# Patient Record
Sex: MALE | Race: BLACK OR AFRICAN AMERICAN | NOT HISPANIC OR LATINO | URBAN - METROPOLITAN AREA
[De-identification: names, ages, dates, MRNs, and addresses within clinical notes are randomized per-mention and may not be internally consistent; named-entity substitution may affect disease eponyms.]

---

## 2018-03-23 ENCOUNTER — EMERGENCY (EMERGENCY)
Facility: HOSPITAL | Age: 58
LOS: 1 days | Discharge: HOME | End: 2018-03-23
Attending: EMERGENCY MEDICINE
Payer: COMMERCIAL

## 2018-03-23 VITALS
RESPIRATION RATE: 20 BRPM | DIASTOLIC BLOOD PRESSURE: 89 MMHG | WEIGHT: 225.09 LBS | TEMPERATURE: 98 F | SYSTOLIC BLOOD PRESSURE: 151 MMHG | HEIGHT: 66 IN | HEART RATE: 68 BPM | OXYGEN SATURATION: 97 %

## 2018-03-23 LAB
ANION GAP SERPL CALC-SCNC: 8 MMOL/L — SIGNIFICANT CHANGE UP (ref 5–17)
BASOPHILS # BLD AUTO: 0.1 K/UL — SIGNIFICANT CHANGE UP (ref 0–0.2)
BASOPHILS NFR BLD AUTO: 2.1 % — HIGH (ref 0–2)
BUN SERPL-MCNC: 13 MG/DL — SIGNIFICANT CHANGE UP (ref 7–18)
CALCIUM SERPL-MCNC: 9.4 MG/DL — SIGNIFICANT CHANGE UP (ref 8.4–10.5)
CHLORIDE SERPL-SCNC: 105 MMOL/L — SIGNIFICANT CHANGE UP (ref 96–108)
CK MB BLD-MCNC: <0.6 % — SIGNIFICANT CHANGE UP (ref 0–3.5)
CK MB CFR SERPL CALC: <1 NG/ML — SIGNIFICANT CHANGE UP (ref 0–3.6)
CK SERPL-CCNC: 161 U/L — SIGNIFICANT CHANGE UP (ref 35–232)
CO2 SERPL-SCNC: 26 MMOL/L — SIGNIFICANT CHANGE UP (ref 22–31)
CREAT SERPL-MCNC: 1.17 MG/DL — SIGNIFICANT CHANGE UP (ref 0.5–1.3)
EOSINOPHIL # BLD AUTO: 0.1 K/UL — SIGNIFICANT CHANGE UP (ref 0–0.5)
EOSINOPHIL NFR BLD AUTO: 2.3 % — SIGNIFICANT CHANGE UP (ref 0–6)
GLUCOSE SERPL-MCNC: 98 MG/DL — SIGNIFICANT CHANGE UP (ref 70–99)
HCT VFR BLD CALC: 50.7 % — HIGH (ref 39–50)
HGB BLD-MCNC: 15.4 G/DL — SIGNIFICANT CHANGE UP (ref 13–17)
LYMPHOCYTES # BLD AUTO: 2.1 K/UL — SIGNIFICANT CHANGE UP (ref 1–3.3)
LYMPHOCYTES # BLD AUTO: 46.5 % — HIGH (ref 13–44)
MCHC RBC-ENTMCNC: 26.6 PG — LOW (ref 27–34)
MCHC RBC-ENTMCNC: 30.5 GM/DL — LOW (ref 32–36)
MCV RBC AUTO: 87.5 FL — SIGNIFICANT CHANGE UP (ref 80–100)
MONOCYTES # BLD AUTO: 0.5 K/UL — SIGNIFICANT CHANGE UP (ref 0–0.9)
MONOCYTES NFR BLD AUTO: 12 % — SIGNIFICANT CHANGE UP (ref 2–14)
NEUTROPHILS # BLD AUTO: 1.7 K/UL — LOW (ref 1.8–7.4)
NEUTROPHILS NFR BLD AUTO: 37.1 % — LOW (ref 43–77)
PLATELET # BLD AUTO: 162 K/UL — SIGNIFICANT CHANGE UP (ref 150–400)
POTASSIUM SERPL-MCNC: 4.1 MMOL/L — SIGNIFICANT CHANGE UP (ref 3.5–5.3)
POTASSIUM SERPL-SCNC: 4.1 MMOL/L — SIGNIFICANT CHANGE UP (ref 3.5–5.3)
RBC # BLD: 5.79 M/UL — SIGNIFICANT CHANGE UP (ref 4.2–5.8)
RBC # FLD: 12.9 % — SIGNIFICANT CHANGE UP (ref 10.3–14.5)
SODIUM SERPL-SCNC: 139 MMOL/L — SIGNIFICANT CHANGE UP (ref 135–145)
TROPONIN I SERPL-MCNC: <0.015 NG/ML — SIGNIFICANT CHANGE UP (ref 0–0.04)
WBC # BLD: 4.6 K/UL — SIGNIFICANT CHANGE UP (ref 3.8–10.5)
WBC # FLD AUTO: 4.6 K/UL — SIGNIFICANT CHANGE UP (ref 3.8–10.5)

## 2018-03-23 PROCEDURE — 82550 ASSAY OF CK (CPK): CPT

## 2018-03-23 PROCEDURE — 71046 X-RAY EXAM CHEST 2 VIEWS: CPT | Mod: 26

## 2018-03-23 PROCEDURE — 85027 COMPLETE CBC AUTOMATED: CPT

## 2018-03-23 PROCEDURE — 82553 CREATINE MB FRACTION: CPT

## 2018-03-23 PROCEDURE — 93005 ELECTROCARDIOGRAM TRACING: CPT

## 2018-03-23 PROCEDURE — 80048 BASIC METABOLIC PNL TOTAL CA: CPT

## 2018-03-23 PROCEDURE — 99284 EMERGENCY DEPT VISIT MOD MDM: CPT

## 2018-03-23 PROCEDURE — 99283 EMERGENCY DEPT VISIT LOW MDM: CPT | Mod: 25

## 2018-03-23 PROCEDURE — 71046 X-RAY EXAM CHEST 2 VIEWS: CPT

## 2018-03-23 PROCEDURE — 84484 ASSAY OF TROPONIN QUANT: CPT

## 2018-03-23 RX ORDER — METFORMIN HYDROCHLORIDE 850 MG/1
0 TABLET ORAL
Qty: 0 | Refills: 0 | COMMUNITY

## 2018-03-23 NOTE — ED PROVIDER NOTE - OBJECTIVE STATEMENT
57 y/o M pt with PMHx of DM presents to ED c/o chest pain with associated mild shortness of breath and ALATORRE x 1 week, worsening over the last two days. Pt reports chest pain is localized both on the R side and L side, going back and forth between the R and L. In ED, pt has no active chest pain. Pt denies fever, chills, nausea, vomiting, diaphoresis, or any other complaints. NKDA.

## 2018-03-23 NOTE — ED PROVIDER NOTE - PROGRESS NOTE DETAILS
Pt reassessed, chest pain improved.  Admission discussed and offered however patient prefers to follow up with PMD.  Pt given copy of all results. Will d/c AMA

## 2021-10-22 NOTE — ED PROVIDER NOTE - NS ED MD EM SELECTION
[id number] Comprehensive Finasteride Male Counseling: Finasteride Counseling:  I discussed with the patient the risks of use of finasteride including but not limited to decreased libido, decreased ejaculate volume, gynecomastia, and depression. Women should not handle medication.  All of the patient's questions and concerns were addressed. Finasteride Counseling:  I discussed with the patient the risks of use of finasteride including but not limited to decreased libido, decreased ejaculate volume, gynecomastia, and depression. Women should not handle medication.  All of the patient's questions and concerns were addressed.

## 2023-02-09 ENCOUNTER — EMERGENCY (EMERGENCY)
Facility: HOSPITAL | Age: 63
LOS: 1 days | Discharge: HOME | End: 2023-02-09
Attending: EMERGENCY MEDICINE | Admitting: EMERGENCY MEDICINE
Payer: COMMERCIAL

## 2023-02-09 VITALS
TEMPERATURE: 98 F | SYSTOLIC BLOOD PRESSURE: 119 MMHG | RESPIRATION RATE: 18 BRPM | OXYGEN SATURATION: 98 % | DIASTOLIC BLOOD PRESSURE: 71 MMHG | HEART RATE: 73 BPM

## 2023-02-09 VITALS
TEMPERATURE: 98 F | DIASTOLIC BLOOD PRESSURE: 70 MMHG | SYSTOLIC BLOOD PRESSURE: 130 MMHG | RESPIRATION RATE: 18 BRPM | OXYGEN SATURATION: 97 % | HEART RATE: 82 BPM

## 2023-02-09 PROBLEM — E11.9 TYPE 2 DIABETES MELLITUS WITHOUT COMPLICATIONS: Chronic | Status: ACTIVE | Noted: 2018-03-23

## 2023-02-09 LAB
ALBUMIN SERPL ELPH-MCNC: 4 G/DL — SIGNIFICANT CHANGE UP (ref 3.3–5)
ALP SERPL-CCNC: 71 U/L — SIGNIFICANT CHANGE UP (ref 40–120)
ALT FLD-CCNC: 8 U/L — SIGNIFICANT CHANGE UP (ref 4–41)
ANION GAP SERPL CALC-SCNC: 10 MMOL/L — SIGNIFICANT CHANGE UP (ref 7–14)
APPEARANCE UR: CLEAR — SIGNIFICANT CHANGE UP
AST SERPL-CCNC: 11 U/L — SIGNIFICANT CHANGE UP (ref 4–40)
BACTERIA # UR AUTO: NEGATIVE — SIGNIFICANT CHANGE UP
BASOPHILS # BLD AUTO: 0.02 K/UL — SIGNIFICANT CHANGE UP (ref 0–0.2)
BASOPHILS NFR BLD AUTO: 0.3 % — SIGNIFICANT CHANGE UP (ref 0–2)
BILIRUB SERPL-MCNC: 0.7 MG/DL — SIGNIFICANT CHANGE UP (ref 0.2–1.2)
BILIRUB UR-MCNC: NEGATIVE — SIGNIFICANT CHANGE UP
BUN SERPL-MCNC: 10 MG/DL — SIGNIFICANT CHANGE UP (ref 7–23)
CALCIUM SERPL-MCNC: 9.4 MG/DL — SIGNIFICANT CHANGE UP (ref 8.4–10.5)
CHLORIDE SERPL-SCNC: 100 MMOL/L — SIGNIFICANT CHANGE UP (ref 98–107)
CO2 SERPL-SCNC: 26 MMOL/L — SIGNIFICANT CHANGE UP (ref 22–31)
COLOR SPEC: YELLOW — SIGNIFICANT CHANGE UP
CREAT SERPL-MCNC: 1 MG/DL — SIGNIFICANT CHANGE UP (ref 0.5–1.3)
DIFF PNL FLD: ABNORMAL
EGFR: 85 ML/MIN/1.73M2 — SIGNIFICANT CHANGE UP
EOSINOPHIL # BLD AUTO: 0.04 K/UL — SIGNIFICANT CHANGE UP (ref 0–0.5)
EOSINOPHIL NFR BLD AUTO: 0.5 % — SIGNIFICANT CHANGE UP (ref 0–6)
EPI CELLS # UR: 3 /HPF — SIGNIFICANT CHANGE UP (ref 0–5)
GLUCOSE SERPL-MCNC: 223 MG/DL — HIGH (ref 70–99)
GLUCOSE UR QL: ABNORMAL
HCT VFR BLD CALC: 48.7 % — SIGNIFICANT CHANGE UP (ref 39–50)
HGB BLD-MCNC: 15.5 G/DL — SIGNIFICANT CHANGE UP (ref 13–17)
HYALINE CASTS # UR AUTO: 1 /LPF — SIGNIFICANT CHANGE UP (ref 0–7)
IANC: 5.23 K/UL — SIGNIFICANT CHANGE UP (ref 1.8–7.4)
IMM GRANULOCYTES NFR BLD AUTO: 0.3 % — SIGNIFICANT CHANGE UP (ref 0–0.9)
KETONES UR-MCNC: ABNORMAL
LEUKOCYTE ESTERASE UR-ACNC: NEGATIVE — SIGNIFICANT CHANGE UP
LIDOCAIN IGE QN: 12 U/L — SIGNIFICANT CHANGE UP (ref 7–60)
LYMPHOCYTES # BLD AUTO: 1.42 K/UL — SIGNIFICANT CHANGE UP (ref 1–3.3)
LYMPHOCYTES # BLD AUTO: 19 % — SIGNIFICANT CHANGE UP (ref 13–44)
MCHC RBC-ENTMCNC: 27 PG — SIGNIFICANT CHANGE UP (ref 27–34)
MCHC RBC-ENTMCNC: 31.8 GM/DL — LOW (ref 32–36)
MCV RBC AUTO: 84.7 FL — SIGNIFICANT CHANGE UP (ref 80–100)
MONOCYTES # BLD AUTO: 0.75 K/UL — SIGNIFICANT CHANGE UP (ref 0–0.9)
MONOCYTES NFR BLD AUTO: 10 % — SIGNIFICANT CHANGE UP (ref 2–14)
NEUTROPHILS # BLD AUTO: 5.23 K/UL — SIGNIFICANT CHANGE UP (ref 1.8–7.4)
NEUTROPHILS NFR BLD AUTO: 69.9 % — SIGNIFICANT CHANGE UP (ref 43–77)
NITRITE UR-MCNC: NEGATIVE — SIGNIFICANT CHANGE UP
NRBC # BLD: 0 /100 WBCS — SIGNIFICANT CHANGE UP (ref 0–0)
NRBC # FLD: 0 K/UL — SIGNIFICANT CHANGE UP (ref 0–0)
PH UR: 7.5 — SIGNIFICANT CHANGE UP (ref 5–8)
PLATELET # BLD AUTO: 164 K/UL — SIGNIFICANT CHANGE UP (ref 150–400)
POTASSIUM SERPL-MCNC: 4.4 MMOL/L — SIGNIFICANT CHANGE UP (ref 3.5–5.3)
POTASSIUM SERPL-SCNC: 4.4 MMOL/L — SIGNIFICANT CHANGE UP (ref 3.5–5.3)
PROT SERPL-MCNC: 7.4 G/DL — SIGNIFICANT CHANGE UP (ref 6–8.3)
PROT UR-MCNC: ABNORMAL
RBC # BLD: 5.75 M/UL — SIGNIFICANT CHANGE UP (ref 4.2–5.8)
RBC # FLD: 13.4 % — SIGNIFICANT CHANGE UP (ref 10.3–14.5)
RBC CASTS # UR COMP ASSIST: 24 /HPF — HIGH (ref 0–4)
SARS-COV-2 RNA SPEC QL NAA+PROBE: SIGNIFICANT CHANGE UP
SODIUM SERPL-SCNC: 136 MMOL/L — SIGNIFICANT CHANGE UP (ref 135–145)
SP GR SPEC: 1.02 — SIGNIFICANT CHANGE UP (ref 1.01–1.05)
TROPONIN T, HIGH SENSITIVITY RESULT: 10 NG/L — SIGNIFICANT CHANGE UP
UROBILINOGEN FLD QL: ABNORMAL
WBC # BLD: 7.48 K/UL — SIGNIFICANT CHANGE UP (ref 3.8–10.5)
WBC # FLD AUTO: 7.48 K/UL — SIGNIFICANT CHANGE UP (ref 3.8–10.5)
WBC UR QL: 8 /HPF — HIGH (ref 0–5)

## 2023-02-09 PROCEDURE — 71045 X-RAY EXAM CHEST 1 VIEW: CPT | Mod: 26

## 2023-02-09 PROCEDURE — 93971 EXTREMITY STUDY: CPT | Mod: 26,LT

## 2023-02-09 PROCEDURE — 99284 EMERGENCY DEPT VISIT MOD MDM: CPT

## 2023-02-09 RX ORDER — CEPHALEXIN 500 MG
1 CAPSULE ORAL
Qty: 14 | Refills: 0
Start: 2023-02-09 | End: 2023-02-15

## 2023-02-09 NOTE — ED PROVIDER NOTE - PATIENT PORTAL LINK FT
You can access the FollowMyHealth Patient Portal offered by [facility] by registering at the following website: [email or web address]. By joining [employer]’s FollowMyHealth portal, you will also be able to view your health information using other applications (apps) compatible with our system.

## 2023-02-09 NOTE — ED PROVIDER NOTE - NSFOLLOWUPINSTRUCTIONS_ED_ALL_ED_FT
Please follow up with your primary care physician within 2-3 days.   Return to the ER for any new or concerning symptoms.   You may take 650 mg acetaminophen every eight hours as needed for pain.   Drink plenty of fluids and rest.      A urinary tract infection, or UTI, is a term for an infection anywhere between the kidneys and the urethra. (The urethra is the tube that carries urine from the bladder to outside the body.) Most UTIs are bladder infections. They often cause pain or burning when you urinate.    UTIs are caused by bacteria. This means they can be cured with antibiotics. Be sure to complete your treatment so that the infection does not get worse.    Follow-up care is a key part of your treatment and safety. Be sure to make and go to all appointments, and call your doctor if you are having problems. It's also a good idea to know your test results and keep a list of the medicines you take.    How can you care for yourself at home?  Take your antibiotics as prescribed. Do not stop taking them just because you feel better. You need to take the full course of antibiotics.  Take your medicines exactly as prescribed. Be sure to take all of your antibiotics, which treat the infection.  Drink extra water for the next day or two. This will help make the urine less concentrated and help wash out the bacteria causing the infection. (If you have kidney, heart, or liver disease and have to limit your fluids, talk with your doctor before you increase your fluid intake.)  Avoid drinks that are carbonated or have caffeine. They can irritate the bladder.  Urinate often. Try to empty your bladder each time.  To relieve pain, take a hot bath or lay a heating pad (set on low) over your lower belly or genital area. Never go to sleep with a heating pad in place.  To help prevent UTIs  Drink plenty of fluids. If you have kidney, heart, or liver disease and have to limit fluids, talk with your doctor before you increase the amount of fluids you drink.  Urinate when you have the urge. Do not hold your urine for a long time. Urinate before you go to sleep.  Keep your penis clean.  Catheter care  If you have a drainage tube (catheter) in place, the following steps will help you care for it.    Always wash your hands before and after touching your catheter.  Check the area around the urethra for inflammation or signs of infection. Signs of infection include irritated, swollen, red, or tender skin, or pus around the catheter.  Clean the area around the catheter with soap and water two times a day. Dry with a clean towel afterward.  Do not apply powder or lotion to the skin around the catheter.  To empty the urine collection bag    Wash your hands with soap and water.  Without touching the drain spout, remove the spout from its sleeve at the bottom of the collection bag. Open the valve on the spout.  Let the urine flow out of the bag and into the toilet or a container. Do not let the tubing or drain spout touch anything.  After you empty the bag, clean the end of the drain spout with tissue and water. Close the valve and put the drain spout back into its sleeve at the bottom of the collection bag.  Wash your hands with soap and water.  When should you call for help?  	  Call your doctor or nurse advice line now or seek immediate medical care if:    Symptoms such as a fever, chills, nausea, or vomiting get worse or happen for the first time.  You have new pain in your back just below your rib cage. This is called flank pain.  There is new blood or pus in your urine.  You are not able to take or keep down your antibiotics.  Watch closely for changes in your health, and be sure to contact your doctor or nurse advice line if:    You do not feel better after 2 days on an antibiotic.  You have any new symptoms, such as a rash.  Your symptoms go away but then come back. Please follow up with your primary care physician within 2-3 days.   Return to the ER for any new or concerning symptoms.   You may take 650 mg acetaminophen every eight hours as needed for pain.   Drink plenty of fluids and rest.      antibiotics were sent to your pharmacy.     The urine showed only blood without bacteria. please follow up with a urologist to further investigate the reason for the blood in the urine.     if you notice any fevers back pain nausea vomiting sweating racing heart beat or severe worsening pain please seek immediate medical attention or return to the ER.

## 2023-02-09 NOTE — ED ADULT NURSE REASSESSMENT NOTE - NS ED NURSE REASSESS COMMENT FT1
Received pt as a dc awaiting dispo papers, breathing well on RA and in NAD, dc teaching done, pt referred to urology, and prescription clarified. spouse at bedside. pt is ambulating with steady gait at the time of ED exit and denies any pain or complaints. repeat VSS.

## 2023-02-09 NOTE — ED PROVIDER NOTE - ATTENDING CONTRIBUTION TO CARE
Agree with medical student and resident note  63-year-old male with history of diabetes presents with dysuria for the past 3 days.  Has noticed that his urine has changed color being somewhat pinkish.  Denies back pain, vomiting, fevers.  States does have suprapubic pain only when urinating.  Denies history of STD eyes.  Also has complaint of midsternal chest pain, no associated symptoms such as shortness of breath, vomiting, dyspnea on exertion.  States pain has been present and constant for months.  Denies night sweats, weight loss.  Physical exam  Gen: pt well appearing in no respiratory distress  vital signs stable  NCAT  Lungs: CTAB/L  Cardiac: s1 s2 no m/r/g  abdomen: soft/NT/ND  ext: no edema  Neuro: CNs intact 5/5 motor UE and LE; sensation intact; gait stable  skin: no rash  EKG nonspecific ST changes in inferior leads  Impression  Dysuria, given history patient more concerned about the color of urine than actual symptoms of dysuria we will check urine likely treat for UTI, no signs or symptoms of prostatitis, given no real pain with hematuria will have patient follow-up with urology for possible cystoscopy if hematuria continues.  As for chest pain EKG shows no acute signs of ischemia we will draw labs including a troponin, chest x-ray  Heart score of 3, atypical history, if troponin and chest x-ray are nonactionable patient can follow-up with outpatient cardiologist

## 2023-02-09 NOTE — ED PROVIDER NOTE - OBJECTIVE STATEMENT
64 yo M w/ MHx of T2DM p/w acute dysuria, hot flashes, and abdominal pain w/ chronic chest pain and R leg pain. Patient states 3 days ago his urine became pink and he has had dysuria during that time. He also notes pinching 6/10 periumbilical abdominal pain. He denies back pain. He says he has had hot flashes during this time as well. Patient states he has had a two month history of R sides chest pain with respiration and R leg pain. Patient denies any n/v and diarrhea. 62 yo M w/ MHx of T2DM p/w acute dysuria, hot flashes, and abdominal pain w/ chronic chest pain and R leg pain. Patient states 3 days ago his urine became pink and he has had dysuria during that time. He also notes pinching 6/10 periumbilical abdominal pain. He denies back pain. He says he has had hot flashes during this time as well. Patient states he has had a two month history of R sides chest pain with respiration and R leg pain. Patient denies any n/v and diarrhea. No hx of blood clot, recent surgery, immobility, hormone use, hx of cancer.

## 2023-02-09 NOTE — ED PROVIDER NOTE - NSFOLLOWUPCLINICS_GEN_ALL_ED_FT
[facility] [place] - Urology  Urology  [address], 3rd & [address]th [place] [address]  Phone: [phone]  Fax:     [facility]  Cardiology  [address] [place] [address]  Phone: [phone]  Fax:

## 2023-02-09 NOTE — ED PROVIDER NOTE - CLINICAL SUMMARY MEDICAL DECISION MAKING FREE TEXT BOX
62 yo M w/ MHx of T2DM p/w acute dysuria, hot flashes, and abdominal pain w/ chronic chest pain and R leg pain. Differential diagnosis includes but is not limited to UTI, DVT, gastritis. Pt with sx concerning for UTI, though presenting with multiple more chronic complaints. Given PT Wells low risk, would get Duplex to r/o DVT. LE similar size and non-swollen appearing. No hx of UTI. No c/o of kidney stone as pain pelvic region. No f/c or diarrhea. would get labs, ecg, cxr and reassess.

## 2023-02-09 NOTE — ED PROVIDER NOTE - PHYSICAL EXAMINATION
General: WN/WD NAD  Head: Atraumatic, normocephalic  Eyes: EOM grossly in tact, no scleral icterus, no discharge  Neurology: A&Ox 3, nonfocal, IGLESIAS x 4  Respiratory: CTAB, no wheezing, normal respiratory effort  CV: RRR, good s1/s2, no S3, Extremities warm and well perfused  Abdominal: Soft, non-distended, non-tender, no masses  Extremities: No edema, no deformities  Skin: warm and dry. No rashes

## 2023-02-09 NOTE — ED ADULT TRIAGE NOTE - CHIEF COMPLAINT QUOTE
Patient brought to Er by EMS from clinic for c/o chest pain, abdominal pain and hot flashes for a week. Pt had elevated Bilirubin and blood in urine at clinic. Type 2 DM; FS: 198

## 2023-02-09 NOTE — ED ADULT NURSE NOTE - NS ED NOTE ABUSE RESPONSE YN
"Oncology Rooming Note    September 7, 2017 8:35 AM   Wade Acevedo is a 60 year old male who presents for:    Chief Complaint   Patient presents with     Oncology Clinic Visit     3 week recheck NSCLC, labs & chemo today     Initial Vitals: /86 (BP Location: Right arm, Patient Position: Sitting, Cuff Size: Adult Regular)  Pulse 66  Temp 98.2  F (36.8  C) (Tympanic)  Resp 18  Ht 1.807 m (5' 11.14\")  Wt 71.2 kg (157 lb)  SpO2 100%  BMI 21.81 kg/m2 Estimated body mass index is 21.81 kg/(m^2) as calculated from the following:    Height as of this encounter: 1.807 m (5' 11.14\").    Weight as of this encounter: 71.2 kg (157 lb). Body surface area is 1.89 meters squared.  Mild Pain (3) Comment: right thigh  8/10   No LMP for male patient.  Allergies reviewed: Yes  Medications reviewed: Yes    Medications: Medication refills not needed today.  Pharmacy name entered into [facility]: [facility] - [address] AT [address]    Clinical concerns:  3 week recheck NSCLC, labs & chemo today.    7  minutes for nursing intake (face to face time)     Reena Leon CMA              " Yes

## 2023-02-09 NOTE — ED ADULT NURSE NOTE - OBJECTIVE STATEMENT
Facilitator RN- Pt received into spot #17. AAOx4, sent in from clinic for elevated bilirubin and blood in his urine. Pt c/o generalized chest pain with periods of hot flashes x1 week. c/o intermittent dizziness/lightheadedness with activities x1 week. Denies LOC. NSR on cardiac monitor, hr in the 70's. Denies n/v. Respiratory even and unlabored. MD perez performed. #20g IV placed to left ac, labs drawn and sent. Report given off to primary RN in area. no acute distress. Awaiting further plan of care.

## 2023-02-13 RX ORDER — CIPROFLOXACIN LACTATE 400MG/40ML
1 VIAL (ML) INTRAVENOUS
Qty: 14 | Refills: 0
Start: 2023-02-13 | End: 2023-02-19

## 2023-02-13 NOTE — ED POST DISCHARGE NOTE - RESULT SUMMARY
UCX : Enterococcus faecalis 10,000-49,000CFU/ml Patient discharged home with a prescription for Keflex. S/W patient still having some discomfort and frequency. Discussed with patient will ERX Cipro Patient to follow up with Urologist. Discussed with patient to follow up with MD for repeat UA/UCX. Discussed with patient need to return to ED if symptoms don't continue to improve or recur or develops any new or worsening symptoms that are of concern.

## 2023-02-17 PROBLEM — Z00.00 ENCOUNTER FOR PREVENTIVE HEALTH EXAMINATION: Status: ACTIVE | Noted: 2023-02-17

## 2023-02-22 ENCOUNTER — APPOINTMENT (OUTPATIENT)
Dept: UROLOGY | Facility: CLINIC | Age: 63
End: 2023-02-22
Payer: COMMERCIAL

## 2023-02-22 VITALS
HEIGHT: 66 IN | DIASTOLIC BLOOD PRESSURE: 80 MMHG | HEART RATE: 69 BPM | WEIGHT: 208 LBS | BODY MASS INDEX: 33.43 KG/M2 | OXYGEN SATURATION: 98 % | SYSTOLIC BLOOD PRESSURE: 147 MMHG

## 2023-02-22 DIAGNOSIS — Z78.9 OTHER SPECIFIED HEALTH STATUS: ICD-10-CM

## 2023-02-22 DIAGNOSIS — Z86.39 PERSONAL HISTORY OF OTHER ENDOCRINE, NUTRITIONAL AND METABOLIC DISEASE: ICD-10-CM

## 2023-02-22 DIAGNOSIS — N39.0 URINARY TRACT INFECTION, SITE NOT SPECIFIED: ICD-10-CM

## 2023-02-22 DIAGNOSIS — Z87.448 PERSONAL HISTORY OF OTHER DISEASES OF URINARY SYSTEM: ICD-10-CM

## 2023-02-22 DIAGNOSIS — A49.9 URINARY TRACT INFECTION, SITE NOT SPECIFIED: ICD-10-CM

## 2023-02-22 PROCEDURE — 99204 OFFICE O/P NEW MOD 45 MIN: CPT

## 2023-02-22 NOTE — ASSESSMENT
[FreeTextEntry1] : follow up after ER visit :  KATE ( feb 2023)\par was there for dysuria , hematuria ,\par no fever or N/V\par hx of chest pain and back and thus doppler leg stucy with CXR done \par u cx was found to have ENTEROCOCCUS - sent home with keflex \par now after done : no dysuria \par similar sxs many years ago\par no hx of renal stones\par no fam hx of pca\par no LUTS \par no bowel c/o \par sexually active: no issues\par here for f/u:\par 1- repeat urine today \par 2- FIDE for eval of kidneys\par 3-recommend CYSTOSCOPY to complete eval for compliacted UTI-- wants to do with diff doctor near him\par 4- psa when infection - about 4 weeks

## 2023-02-22 NOTE — PHYSICAL EXAM
[General Appearance - Well Developed] : well developed [General Appearance - Well Nourished] : well nourished [Normal Appearance] : normal appearance [Well Groomed] : well groomed [General Appearance - In No Acute Distress] : no acute distress [Edema] : no peripheral edema [Respiration, Rhythm And Depth] : normal respiratory rhythm and effort [Exaggerated Use Of Accessory Muscles For Inspiration] : no accessory muscle use [Abdomen Soft] : soft [Abdomen Tenderness] : non-tender [Costovertebral Angle Tenderness] : no ~M costovertebral angle tenderness [FreeTextEntry1] : patient reports being circ [Normal Station and Gait] : the gait and station were normal for the patient's age [] : no rash [No Focal Deficits] : no focal deficits [Oriented To Time, Place, And Person] : oriented to person, place, and time [Affect] : the affect was normal [Mood] : the mood was normal [Not Anxious] : not anxious [No Palpable Adenopathy] : no palpable adenopathy

## 2023-02-24 LAB
APPEARANCE: CLEAR
BACTERIA UR CULT: NORMAL
BACTERIA: NEGATIVE
BILIRUBIN URINE: NEGATIVE
BLOOD URINE: ABNORMAL
COLOR: NORMAL
GLUCOSE QUALITATIVE U: NORMAL
HYALINE CASTS: 1 /LPF
KETONES URINE: NEGATIVE
LEUKOCYTE ESTERASE URINE: NEGATIVE
MICROSCOPIC-UA: NORMAL
NITRITE URINE: NEGATIVE
PH URINE: 6
PROTEIN URINE: NORMAL
RED BLOOD CELLS URINE: 1 /HPF
SPECIFIC GRAVITY URINE: 1.02
SQUAMOUS EPITHELIAL CELLS: 1 /HPF
UROBILINOGEN URINE: NORMAL
WHITE BLOOD CELLS URINE: 2 /HPF

## 2025-06-13 ENCOUNTER — INPATIENT (INPATIENT)
Facility: HOSPITAL | Age: 65
LOS: 3 days | Discharge: HOME | End: 2025-06-17
Attending: HOSPITALIST | Admitting: HOSPITALIST
Payer: COMMERCIAL

## 2025-06-13 VITALS
OXYGEN SATURATION: 98 % | SYSTOLIC BLOOD PRESSURE: 177 MMHG | TEMPERATURE: 99 F | RESPIRATION RATE: 16 BRPM | WEIGHT: 218.04 LBS | DIASTOLIC BLOOD PRESSURE: 79 MMHG | HEART RATE: 61 BPM

## 2025-06-13 LAB
ALBUMIN SERPL ELPH-MCNC: 3.9 G/DL — SIGNIFICANT CHANGE UP (ref 3.3–5)
ALP SERPL-CCNC: 80 U/L — SIGNIFICANT CHANGE UP (ref 40–120)
ALT FLD-CCNC: 7 U/L — SIGNIFICANT CHANGE UP (ref 4–41)
ANION GAP SERPL CALC-SCNC: 14 MMOL/L — SIGNIFICANT CHANGE UP (ref 7–14)
AST SERPL-CCNC: 9 U/L — SIGNIFICANT CHANGE UP (ref 4–40)
BASOPHILS # BLD AUTO: 0.03 K/UL — SIGNIFICANT CHANGE UP (ref 0–0.2)
BASOPHILS NFR BLD AUTO: 0.5 % — SIGNIFICANT CHANGE UP (ref 0–2)
BILIRUB SERPL-MCNC: 0.3 MG/DL — SIGNIFICANT CHANGE UP (ref 0.2–1.2)
BLOOD GAS VENOUS COMPREHENSIVE RESULT: SIGNIFICANT CHANGE UP
BUN SERPL-MCNC: 9 MG/DL — SIGNIFICANT CHANGE UP (ref 7–23)
CALCIUM SERPL-MCNC: 9.4 MG/DL — SIGNIFICANT CHANGE UP (ref 8.4–10.5)
CHLORIDE SERPL-SCNC: 97 MMOL/L — LOW (ref 98–107)
CO2 SERPL-SCNC: 25 MMOL/L — SIGNIFICANT CHANGE UP (ref 22–31)
CREAT SERPL-MCNC: 1.11 MG/DL — SIGNIFICANT CHANGE UP (ref 0.5–1.3)
EGFR: 74 ML/MIN/1.73M2 — SIGNIFICANT CHANGE UP
EGFR: 74 ML/MIN/1.73M2 — SIGNIFICANT CHANGE UP
EOSINOPHIL # BLD AUTO: 0.03 K/UL — SIGNIFICANT CHANGE UP (ref 0–0.5)
EOSINOPHIL NFR BLD AUTO: 0.5 % — SIGNIFICANT CHANGE UP (ref 0–6)
GLUCOSE SERPL-MCNC: 316 MG/DL — HIGH (ref 70–99)
HCT VFR BLD CALC: 48.1 % — SIGNIFICANT CHANGE UP (ref 39–50)
HGB BLD-MCNC: 15.8 G/DL — SIGNIFICANT CHANGE UP (ref 13–17)
IANC: 3.21 K/UL — SIGNIFICANT CHANGE UP (ref 1.8–7.4)
IMM GRANULOCYTES NFR BLD AUTO: 0.2 % — SIGNIFICANT CHANGE UP (ref 0–0.9)
LIDOCAIN IGE QN: 594 U/L — HIGH (ref 7–60)
LYMPHOCYTES # BLD AUTO: 1.62 K/UL — SIGNIFICANT CHANGE UP (ref 1–3.3)
LYMPHOCYTES # BLD AUTO: 28.5 % — SIGNIFICANT CHANGE UP (ref 13–44)
MCHC RBC-ENTMCNC: 27.7 PG — SIGNIFICANT CHANGE UP (ref 27–34)
MCHC RBC-ENTMCNC: 32.8 G/DL — SIGNIFICANT CHANGE UP (ref 32–36)
MCV RBC AUTO: 84.4 FL — SIGNIFICANT CHANGE UP (ref 80–100)
MONOCYTES # BLD AUTO: 0.78 K/UL — SIGNIFICANT CHANGE UP (ref 0–0.9)
MONOCYTES NFR BLD AUTO: 13.7 % — SIGNIFICANT CHANGE UP (ref 2–14)
NEUTROPHILS # BLD AUTO: 3.21 K/UL — SIGNIFICANT CHANGE UP (ref 1.8–7.4)
NEUTROPHILS NFR BLD AUTO: 56.6 % — SIGNIFICANT CHANGE UP (ref 43–77)
NRBC # BLD AUTO: 0 K/UL — SIGNIFICANT CHANGE UP (ref 0–0)
NRBC # FLD: 0 K/UL — SIGNIFICANT CHANGE UP (ref 0–0)
NRBC BLD AUTO-RTO: 0 /100 WBCS — SIGNIFICANT CHANGE UP (ref 0–0)
PLATELET # BLD AUTO: 169 K/UL — SIGNIFICANT CHANGE UP (ref 150–400)
POTASSIUM SERPL-MCNC: 4.4 MMOL/L — SIGNIFICANT CHANGE UP (ref 3.5–5.3)
POTASSIUM SERPL-SCNC: 4.4 MMOL/L — SIGNIFICANT CHANGE UP (ref 3.5–5.3)
PROT SERPL-MCNC: 7.4 G/DL — SIGNIFICANT CHANGE UP (ref 6–8.3)
RBC # BLD: 5.7 M/UL — SIGNIFICANT CHANGE UP (ref 4.2–5.8)
RBC # FLD: 13.1 % — SIGNIFICANT CHANGE UP (ref 10.3–14.5)
SODIUM SERPL-SCNC: 136 MMOL/L — SIGNIFICANT CHANGE UP (ref 135–145)
TROPONIN T, HIGH SENSITIVITY RESULT: 10 NG/L — SIGNIFICANT CHANGE UP
WBC # BLD: 5.68 K/UL — SIGNIFICANT CHANGE UP (ref 3.8–10.5)
WBC # FLD AUTO: 5.68 K/UL — SIGNIFICANT CHANGE UP (ref 3.8–10.5)

## 2025-06-13 PROCEDURE — 76705 ECHO EXAM OF ABDOMEN: CPT | Mod: 26

## 2025-06-13 PROCEDURE — 71275 CT ANGIOGRAPHY CHEST: CPT | Mod: 26

## 2025-06-13 PROCEDURE — 74174 CTA ABD&PLVS W/CONTRAST: CPT | Mod: 26

## 2025-06-13 PROCEDURE — 99285 EMERGENCY DEPT VISIT HI MDM: CPT

## 2025-06-13 RX ORDER — MAGNESIUM, ALUMINUM HYDROXIDE 200-200 MG
30 TABLET,CHEWABLE ORAL ONCE
Refills: 0 | Status: COMPLETED | OUTPATIENT
Start: 2025-06-13 | End: 2025-06-13

## 2025-06-13 RX ORDER — ACETAMINOPHEN 500 MG/5ML
1000 LIQUID (ML) ORAL ONCE
Refills: 0 | Status: COMPLETED | OUTPATIENT
Start: 2025-06-13 | End: 2025-06-13

## 2025-06-13 RX ADMIN — Medication 1000 MILLILITER(S): at 21:52

## 2025-06-13 RX ADMIN — Medication 20 MILLIGRAM(S): at 20:56

## 2025-06-13 RX ADMIN — Medication 30 MILLILITER(S): at 20:56

## 2025-06-13 RX ADMIN — Medication 400 MILLIGRAM(S): at 20:56

## 2025-06-13 NOTE — ED ADULT NURSE NOTE - OBJECTIVE STATEMENT
Received pt in room 19. pt A&OX3, ambulatory. Pt with hx DM2. pt c/o abdominal distension and epigastric pain x 1 week. states the pain is intermittent. also reports constipation. pt with LE swelling. appears to be in no distress at this time. NSR on monitor. respirations are equal and nonlabored, no respiratory distress noted. denies any chest pain, sob, fever, cough, n/v/d. vitals as noted. 20 gauge iv placed in the right ac, labs sent. pt medicated as per orders. stable, awaiting further plan, will reassess and monitor.

## 2025-06-13 NOTE — ED PROVIDER NOTE - PHYSICAL EXAMINATION
Abdullahi Avila DO (PGY1)   Physical Exam:    Gen: NAD, AOx3  Head: NCAT  HEENT: EOMI, PEERLA, pink and moist mucous membranes  Lung: CTAB, no respiratory distress, no wheezes/rhonchi/rales B/L  CV: RRR, no murmurs, rubs or gallops  Abd: soft, ND, no guarding, no rigidity, no rebound tenderness, no CVA tenderness.  Epigastric tenderness to palpation  MSK: no visible deformities, ROM normal in UE/LE, no back pain  Neuro: No focal sensory or motor deficits. Sensation intact to light touch all extremities.  Skin: Warm, well perfused, no rash, no leg swelling  Psych: normal affect, calm

## 2025-06-13 NOTE — ED ADULT NURSE NOTE - NSFALLUNIVINTERV_ED_ALL_ED
Bed/Stretcher in lowest position, wheels locked, appropriate side rails in place/Call bell, personal items and telephone in reach/Instruct patient to call for assistance before getting out of bed/chair/stretcher/Non-slip footwear applied when patient is off stretcher/[place] to call system/Physically safe environment - no spills, clutter or unnecessary equipment/Purposeful proactive rounding/Room/bathroom lighting operational, light cord in reach

## 2025-06-13 NOTE — ED ADULT NURSE REASSESSMENT NOTE - NS ED NURSE REASSESS COMMENT FT1
Patient resting comfortably on stretcher. denies any pain now, not in acute distress. VSS. Alert and oriented x 4, safety maintained. NSR on tele. Nursing care is ongoing.

## 2025-06-13 NOTE — ED ADULT TRIAGE NOTE - BP NONINVASIVE DIASTOLIC (MM HG)
Asthma ROS: taking medications as instructed, no medication side effects noted, no significant ongoing wheezing or shortness of breath, using bronchodilator MDI less than twice a week.   New concerns: NONE  Exam: appears well, vitals normal, no respiratory distress, acyanotic, normal RR, chest clear, no wheezing, crepitations, rhonchi, normal symmetric air entry.   Assessment:  Asthma poorly controlled.    Plan: CONTIUE BREO. CONTUNUE SINGULAIR.    79

## 2025-06-13 NOTE — ED ADULT TRIAGE NOTE - CHIEF COMPLAINT QUOTE
Pt c/o abd distension and pain x 1 week, pain intermittent. endorsing constipation with small BM yesterday. Denies fever, chills, chest pain, sob, n/v, diarrhea, urinary symptoms, LE swelling. pmhx: dm2

## 2025-06-13 NOTE — ED PROVIDER NOTE - DISPOSITION TYPE
Airway  Urgency: elective    Date/Time: 3/23/2017 8:05 AM  Airway not difficult    General Information and Staff    Patient location during procedure: OR    Indications and Patient Condition  Indications for airway management: airway protection    Preoxygenated: yes  MILS maintained throughout  Mask difficulty assessment: 1 - vent by mask    Final Airway Details  Final airway type: endotracheal airway      Successful airway: ETT    Successful intubation technique: direct laryngoscopy  Facilitating devices/methods: intubating stylet  Endotracheal tube insertion site: oral  Blade: Yakov  Blade size: #3  ETT size: 7.5 mm  Cormack-Lehane Classification: grade I - full view of glottis  Placement verified by: capnometry   Measured from: lips  ETT to lips (cm): 21  Number of attempts at approach: 1              
ADMIT

## 2025-06-13 NOTE — ED PROVIDER NOTE - PROGRESS NOTE DETAILS
Patient results explained, still having pain upon eating notes that he only can tolerate liquid foods right now, okay to send in hospital for symptomatic management and further investigation of causes of pancreatitis.

## 2025-06-13 NOTE — ED PROVIDER NOTE - OBJECTIVE STATEMENT
65-year-old male with past medical history of diabetes mellitus, hypertension, hyperlipidemia, presents today for 1 weeks worth of intermittent abdominal pain.  Notes that the pain is always constant, but goes up and down at random times particularly immediately after meals.  He told his PCP about his symptoms who sent him in to be evaluated for any possible abdominal aortic aneurysm that may have burst.  He notes that the pain does radiate to his back.  He does not smoke, does have a history of hypertension, is male, near 66.  Does endorse significant burping.  Otherwise does not have any nausea, vomiting, constipation, melena, hematochezia, fever, chills, night sweats, chest pain, shortness of breath, diarrhea.

## 2025-06-13 NOTE — ED PROVIDER NOTE - CLINICAL SUMMARY MEDICAL DECISION MAKING FREE TEXT BOX
65-year-old male with past medical history of diabetes mellitus, hypertension, hyperlipidemia, presents today for 1 weeks worth of intermittent abdominal pain.    Physical examination remarkable for Epigastric tenderness to palpation.  Differentials include peptic ulcer disease, pancreatitis, acute cholecystitis due to the pain after eating although less likely since his immediate, biliary colic, cholelithiasis, ileitis, gastritis, we will rule out any aortic dissection or abdominal aortic aneurysm per the history of back pain and the PCP sent him in for evaluation of a AAA, will rule out any atypical ACS with the epigastric pain with some cardiac risk factors.  Plan includes labs and imaging for the above symptomatic management as necessary and reassess. Jackie att: 65-year-old male with past medical history of diabetes mellitus, hypertension, hyperlipidemia, presents today for 1 weeks worth of intermittent abdominal pain.    Physical examination remarkable for Epigastric tenderness to palpation.  Differentials include peptic ulcer disease, pancreatitis, acute cholecystitis due to the pain after eating although less likely since his immediate, biliary colic, cholelithiasis, ileitis, gastritis, we will rule out any aortic dissection or abdominal aortic aneurysm per the history of back pain and the PCP sent him in for evaluation of a AAA, will rule out any atypical ACS with the epigastric pain with some cardiac risk factors.  Plan includes labs and imaging for the above symptomatic management as necessary and reassess.

## 2025-06-14 DIAGNOSIS — K85.90 ACUTE PANCREATITIS WITHOUT NECROSIS OR INFECTION, UNSPECIFIED: ICD-10-CM

## 2025-06-14 DIAGNOSIS — E11.9 TYPE 2 DIABETES MELLITUS WITHOUT COMPLICATIONS: ICD-10-CM

## 2025-06-14 DIAGNOSIS — Z29.9 ENCOUNTER FOR PROPHYLACTIC MEASURES, UNSPECIFIED: ICD-10-CM

## 2025-06-14 DIAGNOSIS — E78.5 HYPERLIPIDEMIA, UNSPECIFIED: ICD-10-CM

## 2025-06-14 LAB
A1C WITH ESTIMATED AVERAGE GLUCOSE RESULT: 10.5 % — HIGH (ref 4–5.6)
ADD ON TEST-SPECIMEN IN LAB: SIGNIFICANT CHANGE UP
CHOLEST SERPL-MCNC: 188 MG/DL — SIGNIFICANT CHANGE UP
ESTIMATED AVERAGE GLUCOSE: 255 — SIGNIFICANT CHANGE UP
GLUCOSE BLDC GLUCOMTR-MCNC: 180 MG/DL — HIGH (ref 70–99)
GLUCOSE BLDC GLUCOMTR-MCNC: 207 MG/DL — HIGH (ref 70–99)
GLUCOSE BLDC GLUCOMTR-MCNC: 211 MG/DL — HIGH (ref 70–99)
GLUCOSE BLDC GLUCOMTR-MCNC: 249 MG/DL — HIGH (ref 70–99)
GLUCOSE BLDC GLUCOMTR-MCNC: 251 MG/DL — HIGH (ref 70–99)
HDLC SERPL-MCNC: 44 MG/DL — SIGNIFICANT CHANGE UP
LDLC SERPL-MCNC: 126 MG/DL — HIGH
LIPID PNL WITH DIRECT LDL SERPL: 126 MG/DL — HIGH
NONHDLC SERPL-MCNC: 144 MG/DL — HIGH
TRIGL SERPL-MCNC: 99 MG/DL — SIGNIFICANT CHANGE UP
TRIGL SERPL-MCNC: 99 MG/DL — SIGNIFICANT CHANGE UP

## 2025-06-14 PROCEDURE — 99223 1ST HOSP IP/OBS HIGH 75: CPT

## 2025-06-14 PROCEDURE — 99232 SBSQ HOSP IP/OBS MODERATE 35: CPT

## 2025-06-14 RX ORDER — INSULIN LISPRO 100 U/ML
5 INJECTION, SOLUTION INTRAVENOUS; SUBCUTANEOUS
Refills: 0 | Status: DISCONTINUED | OUTPATIENT
Start: 2025-06-14 | End: 2025-06-16

## 2025-06-14 RX ORDER — INSULIN GLARGINE-YFGN 100 [IU]/ML
15 INJECTION, SOLUTION SUBCUTANEOUS AT BEDTIME
Refills: 0 | Status: DISCONTINUED | OUTPATIENT
Start: 2025-06-14 | End: 2025-06-15

## 2025-06-14 RX ORDER — SODIUM CHLORIDE 9 G/1000ML
1000 INJECTION, SOLUTION INTRAVENOUS
Refills: 0 | Status: DISCONTINUED | OUTPATIENT
Start: 2025-06-14 | End: 2025-06-15

## 2025-06-14 RX ORDER — SODIUM CHLORIDE 9 G/1000ML
1000 INJECTION, SOLUTION INTRAVENOUS
Refills: 0 | Status: DISCONTINUED | OUTPATIENT
Start: 2025-06-14 | End: 2025-06-16

## 2025-06-14 RX ORDER — ATORVASTATIN CALCIUM 80 MG/1
10 TABLET, FILM COATED ORAL AT BEDTIME
Refills: 0 | Status: DISCONTINUED | OUTPATIENT
Start: 2025-06-14 | End: 2025-06-15

## 2025-06-14 RX ORDER — DEXTROSE 50 % IN WATER 50 %
25 SYRINGE (ML) INTRAVENOUS ONCE
Refills: 0 | Status: DISCONTINUED | OUTPATIENT
Start: 2025-06-14 | End: 2025-06-17

## 2025-06-14 RX ORDER — INSULIN LISPRO 100 U/ML
INJECTION, SOLUTION INTRAVENOUS; SUBCUTANEOUS
Refills: 0 | Status: DISCONTINUED | OUTPATIENT
Start: 2025-06-14 | End: 2025-06-17

## 2025-06-14 RX ORDER — POLYETHYLENE GLYCOL 3350 17 G/17G
17 POWDER, FOR SOLUTION ORAL ONCE
Refills: 0 | Status: COMPLETED | OUTPATIENT
Start: 2025-06-14 | End: 2025-06-14

## 2025-06-14 RX ORDER — INSULIN LISPRO 100 U/ML
INJECTION, SOLUTION INTRAVENOUS; SUBCUTANEOUS AT BEDTIME
Refills: 0 | Status: DISCONTINUED | OUTPATIENT
Start: 2025-06-14 | End: 2025-06-17

## 2025-06-14 RX ORDER — SENNA 187 MG
2 TABLET ORAL AT BEDTIME
Refills: 0 | Status: DISCONTINUED | OUTPATIENT
Start: 2025-06-14 | End: 2025-06-17

## 2025-06-14 RX ORDER — DEXTROSE 50 % IN WATER 50 %
15 SYRINGE (ML) INTRAVENOUS ONCE
Refills: 0 | Status: DISCONTINUED | OUTPATIENT
Start: 2025-06-14 | End: 2025-06-17

## 2025-06-14 RX ORDER — ENOXAPARIN SODIUM 100 MG/ML
40 INJECTION SUBCUTANEOUS EVERY 24 HOURS
Refills: 0 | Status: DISCONTINUED | OUTPATIENT
Start: 2025-06-14 | End: 2025-06-17

## 2025-06-14 RX ORDER — DEXTROSE 50 % IN WATER 50 %
12.5 SYRINGE (ML) INTRAVENOUS ONCE
Refills: 0 | Status: DISCONTINUED | OUTPATIENT
Start: 2025-06-14 | End: 2025-06-17

## 2025-06-14 RX ORDER — ACETAMINOPHEN 500 MG/5ML
650 LIQUID (ML) ORAL EVERY 6 HOURS
Refills: 0 | Status: DISCONTINUED | OUTPATIENT
Start: 2025-06-14 | End: 2025-06-17

## 2025-06-14 RX ORDER — GLUCAGON 3 MG/1
1 POWDER NASAL ONCE
Refills: 0 | Status: DISCONTINUED | OUTPATIENT
Start: 2025-06-14 | End: 2025-06-16

## 2025-06-14 RX ADMIN — SODIUM CHLORIDE 100 MILLILITER(S): 9 INJECTION, SOLUTION INTRAVENOUS at 04:03

## 2025-06-14 RX ADMIN — POLYETHYLENE GLYCOL 3350 17 GRAM(S): 17 POWDER, FOR SOLUTION ORAL at 21:39

## 2025-06-14 RX ADMIN — Medication 2 TABLET(S): at 21:35

## 2025-06-14 RX ADMIN — INSULIN LISPRO 3: 100 INJECTION, SOLUTION INTRAVENOUS; SUBCUTANEOUS at 08:31

## 2025-06-14 RX ADMIN — ATORVASTATIN CALCIUM 10 MILLIGRAM(S): 80 TABLET, FILM COATED ORAL at 21:36

## 2025-06-14 RX ADMIN — Medication 4 MILLIGRAM(S): at 02:34

## 2025-06-14 RX ADMIN — ENOXAPARIN SODIUM 40 MILLIGRAM(S): 100 INJECTION SUBCUTANEOUS at 17:34

## 2025-06-14 RX ADMIN — INSULIN LISPRO 2: 100 INJECTION, SOLUTION INTRAVENOUS; SUBCUTANEOUS at 17:33

## 2025-06-14 RX ADMIN — INSULIN GLARGINE-YFGN 15 UNIT(S): 100 INJECTION, SOLUTION SUBCUTANEOUS at 21:46

## 2025-06-14 RX ADMIN — INSULIN LISPRO 2: 100 INJECTION, SOLUTION INTRAVENOUS; SUBCUTANEOUS at 12:25

## 2025-06-14 RX ADMIN — Medication 2 MILLIGRAM(S): at 05:02

## 2025-06-14 NOTE — H&P ADULT - NSHPPHYSICALEXAM_GEN_ALL_CORE
Vital Signs Last 24 Hrs  T(C): 36.7 (14 Jun 2025 04:48), Max: 37 (13 Jun 2025 19:10)  T(F): 98 (14 Jun 2025 04:48), Max: 98.6 (13 Jun 2025 19:10)  HR: 53 (14 Jun 2025 04:48) (53 - 70)  BP: 158/76 (14 Jun 2025 04:48) (132/87 - 177/79)  RR: 16 (14 Jun 2025 04:48) (16 - 17)  SpO2: 99% (14 Jun 2025 04:48) (98% - 100%)    Parameters below as of 14 Jun 2025 04:48  Patient On (Oxygen Delivery Method): room air    PHYSICAL EXAM:  GENERAL: NAD  HEAD:  Atraumatic, Normocephalic  EYES: conjunctiva and sclera clear  ENT: slightly dry MM  NECK: Supple, No JVD  CHEST/LUNG: Clear to auscultation bilaterally; No wheezes, rales or rhonchi; normal work of breathing, speaking in full sentences  HEART: Regular rate and rhythm; No murmurs, rubs, or gallops, (+)S1, S2  ABDOMEN: Soft, (+) epigastric TTP, Nondistended; Normal Bowel sounds   EXTREMITIES:  2+ Peripheral Pulses, No clubbing, cyanosis, or edema  PSYCH: normal mood and affect, A&Ox3  NEUROLOGY: no focal neuro deficits  SKIN: No rashes or lesions

## 2025-06-14 NOTE — H&P ADULT - HISTORY OF PRESENT ILLNESS
65 -year-old male with NIDDM2, HTN, HLD, presenting from home with 1 week of intermittent epigastric pain, radiating to the back, described as a "biting pain" that is worse with eating. Denies any associated nausea or vomiting. Patient reports having similar pains in the past but denies any known history of pancreatitis. Patient reports drinking socially 1-2x/week, reports pain worsening after drinking wine earlier this week. Denies any new medications, denies taking any supplements. Only recent travel was to [place] in May.     In the ED VS:  98.6  60-70  132-177/79-87  16-17  %RA, received NS1L IVF, famotidine 20mg IV x1, aluminum hydroxide/magnesium hydroxide/simethicone 30mL PO x1, acetaminophen 1g IVPB x1, morphine 2mg IV x1 and 4mg IV x1

## 2025-06-14 NOTE — PATIENT PROFILE ADULT - HISTORY OF COVID-19 VACCINATION
Comments:     Last Office Visit (last PCP visit):   9/28/2022    Next Visit Date:  No future appointments. **If hasn't been seen in over a year OR hasn't followed up according to last diabetes/ADHD visit, make appointment for patient before sending refill to provider.     Rx requested:  Requested Prescriptions     Pending Prescriptions Disp Refills    tamsulosin (FLOMAX) 0.4 MG capsule 90 capsule 3     Sig: Take 1 capsule by mouth daily    sildenafil (VIAGRA) 100 MG tablet 27 tablet 3     Sig: Take 1 tablet by mouth as needed for Erectile Dysfunction Vaccine status unknown

## 2025-06-14 NOTE — H&P ADULT - ASSESSMENT
65 -year-old male with NIDDM2, HTN, HLD, presenting from home with 1 week of intermittent epigastric pain found to have acute interstitial edematous pancreatitis

## 2025-06-14 NOTE — PROGRESS NOTE ADULT - PROBLEM SELECTOR PLAN 1
unclear etiology however given recent alcohol use which exacerbated pain, recommended abstaining from alcohol   c/w IVF  Clear liquId diet , will advance as tolerated  acetaminophen and morphine PRN  MRCP pending for further eval given relatively isolated involvement of the pancreatic head  triglycerides wnl  gb wnl on imaging   no new meds/supplements

## 2025-06-14 NOTE — H&P ADULT - PROBLEM SELECTOR PLAN 1
unclear etiology however given recent alcohol use which exacerbated pain, recommended abstaining from alcohol   c/w IVF  diet as tolerated  acetaminophen and morphine PRN  MRCP pending for further eval given relatively isolated involvement of the pancreatic head  triglycerides wnl  gb wnl on imaging   no new meds/supplements

## 2025-06-14 NOTE — CHART NOTE - NSCHARTNOTEFT_GEN_A_CORE
65M with DM2, HTN, HLD, presenting from home with 1 week of intermittent epigastric pain found to have acute interstitial edematous pancreatitis. A1c noted to be 12.6 with hyperglycemia. Endocrine consulted for management of DM2.    #Uncontrolled T2DM with hyperglycemia  - A1c 12.6%  - Home meds: Jardiance 10 mg, glyburide-MFM 5-1000 mg bid  - Ordered for low dose Admelog scale with meals and low dose Admelog scale at bedtime   - Weight 96.6 kg  - BMI 33.3  - eGFR 74    PLAN  While inpatient:  - Start Lantus 15 units QHS and Admelog 5 units TIDAC (HOLD if NPO)  - c/w low dose correctional insulin with meals and low dose correctional insulin at bedtime  - Check FS before meals and at bedtime - if NPO, check q6 hours  - Goal -180  - Hypoglycemia protocol  - Please obtain nutrition consult  - Please obtain lipid panel in AM    Full consult to follow in AM    D/w primary team     Jayla Alba MD  Endocrinology Fellow  Can be reached via [employer] Teams    For follow up questions, discharge recommendations, or new consults, please email [email or web address] or [email or web address] ([email or web address]) or call answering service at [phone] (weekdays); [phone] (nights/weekends).  For emergencies please page fellow on call.

## 2025-06-14 NOTE — H&P ADULT - NSHPREVIEWOFSYSTEMS_GEN_ALL_CORE
REVIEW OF SYSTEMS:    CONSTITUTIONAL: No weakness, fevers or chills; No night sweats; No weight loss  EYES/ENT: No visual changes; No dysphagia; No sore throat; No rhinorrhea; No sinus pain/pressure  NECK: No pain or stiffness  RESPIRATORY: No cough, wheezing, hemoptysis; No shortness of breath  CARDIOVASCULAR: No chest pain or palpitations; No lower extremity edema  GASTROINTESTINAL: (+) epigastric pain. No nausea, vomiting, or hematemesis; No diarrhea or constipation. No melena or hematochezia.  GENITOURINARY: No dysuria, frequency or hematuria  NEUROLOGICAL: No numbness, paresthesias, or weakness; No HA; No LH/dizziness  MSK: ambulates without aid; No falls  SKIN: No itching, burning, rashes, or lesions   All other review of systems is negative unless indicated above.

## 2025-06-14 NOTE — PROGRESS NOTE ADULT - SUBJECTIVE AND OBJECTIVE BOX
Patient is a 65y old  Male who presents with a chief complaint of acute pancreatitis (14 Jun 2025 06:59)      SUBJECTIVE / OVERNIGHT EVENTS: patient seen and examined by bedside, pt alert and awake, c/o abdominal pain , epigastric , radiating to the back , denies nausea and vomiting       MEDICATIONS  (STANDING):  atorvastatin 10 milliGRAM(s) Oral at bedtime  dextrose 5%. 1000 milliLiter(s) (100 mL/Hr) IV Continuous <Continuous>  dextrose 5%. 1000 milliLiter(s) (50 mL/Hr) IV Continuous <Continuous>  dextrose 50% Injectable 25 Gram(s) IV Push once  dextrose 50% Injectable 12.5 Gram(s) IV Push once  dextrose 50% Injectable 25 Gram(s) IV Push once  enoxaparin Injectable 40 milliGRAM(s) SubCutaneous every 24 hours  glucagon  Injectable 1 milliGRAM(s) IntraMuscular once  insulin lispro (ADMELOG) corrective regimen sliding scale   SubCutaneous three times a day before meals  insulin lispro (ADMELOG) corrective regimen sliding scale   SubCutaneous at bedtime  lactated ringers. 1000 milliLiter(s) (100 mL/Hr) IV Continuous <Continuous>  lactated ringers. 1000 milliLiter(s) (100 mL/Hr) IV Continuous <Continuous>  senna 2 Tablet(s) Oral at bedtime    MEDICATIONS  (PRN):  acetaminophen     Tablet .. 650 milliGRAM(s) Oral every 6 hours PRN Mild Pain (1 - 3)  dextrose Oral Gel 15 Gram(s) Oral once PRN Blood Glucose LESS THAN 70 milliGRAM(s)/deciliter  morphine  - Injectable 2 milliGRAM(s) IV Push every 4 hours PRN Moderate Pain (4 - 6)  morphine  - Injectable 4 milliGRAM(s) IV Push every 4 hours PRN Severe Pain (7 - 10)      Vital Signs Last 24 Hrs  T(C): 36.9 (14 Jun 2025 13:20), Max: 37 (13 Jun 2025 19:10)  T(F): 98.4 (14 Jun 2025 13:20), Max: 98.6 (13 Jun 2025 19:10)  HR: 58 (14 Jun 2025 13:20) (53 - 70)  BP: 129/66 (14 Jun 2025 13:20) (129/66 - 177/79)  BP(mean): --  RR: 18 (14 Jun 2025 13:20) (16 - 18)  SpO2: 98% (14 Jun 2025 13:20) (97% - 100%)    Parameters below as of 14 Jun 2025 13:20  Patient On (Oxygen Delivery Method): room air      CAPILLARY BLOOD GLUCOSE      POCT Blood Glucose.: 211 mg/dL (14 Jun 2025 11:59)  POCT Blood Glucose.: 251 mg/dL (14 Jun 2025 08:08)  POCT Blood Glucose.: 249 mg/dL (14 Jun 2025 07:06)  POCT Blood Glucose.: 256 mg/dL (14 Jun 2025 01:34)  POCT Blood Glucose.: 280 mg/dL (13 Jun 2025 19:13)    I&O's Summary    14 Jun 2025 07:01  -  14 Jun 2025 16:12  --------------------------------------------------------  IN: 1400 mL / OUT: 0 mL / NET: 1400 mL        PHYSICAL EXAM:  GENERAL: NAD,  HEAD:  Atraumatic, Normocephalic  EYES: EOMI, PERRLA, conjunctiva and sclera clear  NECK: Supple, No JVD  CHEST/LUNG: Clear to auscultation bilaterally; No wheeze  HEART: Regular rate and rhythm; No murmurs, rubs, or gallops  ABDOMEN: Soft, epigastric tenderness , Nondistended; Bowel sounds present  EXTREMITIES:  2+ Peripheral Pulses, No clubbing, cyanosis, or edema  PSYCH: AAOx3  NEUROLOGY: non-focal  SKIN: No rashes or lesions    LABS:                        15.8   5.68  )-----------( 169      ( 13 Jun 2025 20:45 )             48.1     06-13    136  |  97[L]  |  9   ----------------------------<  316[H]  4.4   |  25  |  1.11    Ca    9.4      13 Jun 2025 20:45    TPro  7.4  /  Alb  3.9  /  TBili  0.3  /  DBili  x   /  AST  9   /  ALT  7   /  AlkPhos  80  06-13          Urinalysis Basic - ( 13 Jun 2025 20:45 )    Color: x / Appearance: x / SG: x / pH: x  Gluc: 316 mg/dL / Ketone: x  / Bili: x / Urobili: x   Blood: x / Protein: x / Nitrite: x   Leuk Esterase: x / RBC: x / WBC x   Sq Epi: x / Non Sq Epi: x / Bacteria: x        RADIOLOGY & ADDITIONAL TESTS:    Imaging Personally Reviewed:    Consultant(s) Notes Reviewed:      Care Discussed with Consultants/Other Providers:

## 2025-06-14 NOTE — H&P ADULT - NSHPLABSRESULTS_GEN_ALL_CORE
15.8   5.68  )-----------( 169      ( 13 Jun 2025 20:45 )             48.1     136  |  97[L]  |  9   ----------------------------<  316[H]     06-13  4.4   |  25  |  1.11    Ca    9.4      13 Jun 2025 20:45    TPro  7.4  /  Alb  3.9  /  TBili  0.3  /  DBili  x   /  AST  9   /  ALT  7   /  AlkPhos  80  06-13    Lipase: 594 U/L (06.13.25 @ 20:45)    20:45 - VBG - pH: 7.37  | pCO2: 54    | pO2: 25    | Lactate: 1.2      hs Troponin, T - 10 ng/L (06-13-25 @ 20:45)    Lipid Profile (06.14.25 @ 04:05)    Cholesterol: 188 mg/dL    HDL Cholesterol: 44 mg/dL    Non HDL Cholesterol: 144 mg/dL    LDL Cholesterol Calculated: 126 mg/dL    < from: CT Angio Chest Aorta w/wo IV Cont (06.13.25 @ 22:44) >/< from: CT Angio Abdomen and Pelvis w/ IV Cont (06.13.25 @ 22:45) >  Aorta angiogram: No aortic intramural hematoma, aneurysm, or evidence of acute dissection.  Lymph nodes: No lymphadenopathy.  Heart/vasculature: Heart size is normal. No pericardial effusion. No central pulmonary embolus.  Airways/lungs/pleura: Patent central airways. No focal pulmonary consolidation. No pleural effusion.  LIVER: Within normal limits.  BILE DUCTS: Normal caliber.  GALLBLADDER: Within normal limits.  SPLEEN: Within normal limits.  PANCREAS: Fullness of the pancreatic head with mild edema and peripancreatic inflammation. No pancreatic ductal dilatation. No areas of pancreatic overt hypoenhancement.  ADRENALS: Within normal limits.  KIDNEYS/URETERS: No hydronephrosis. 5.2 cm left renal cyst  BLADDER: Within normal limits.  REPRODUCTIVE ORGANS: Prostate is enlarged.  BOWEL: No bowel obstruction. Appendix is normal.  PERITONEUM: No ascites.  VESSELS:  Atherosclerotic change. No aortic dissection or aneurysm.  RETROPERITONEUM: No lymphadenopathy.  ABDOMINAL WALL: 1 no fat-containing umbilical hernia and additional fat-containing supraumbilical hernia.  BONES: Mild degenerative change.  IMPRESSION: No aortic dissection or aneurysm. Constellation of findings concerning for interstitial edematous pancreatitis most prominent involving the pancreatic head. Given relatively isolated involvement of the pancreatic head, short-term follow-up contrast-enhanced MRI may be obtained to evaluate for underlying lesion, if no contraindications exist.  < end of copied text >    < from: US Abdomen Upper Quadrant Right (06.13.25 @ 23:37) >  Liver: Increased echogenicity.  Bile ducts: Normal caliber. Common bile duct measures 4 mm.  Gallbladder: Within normal limits. No stones or gallbladder wall   thickening. No sonographic Valencia's sign.  Pancreas: Poorly visualized.  Right kidney: 11.6 cm. No hydronephrosis.  Ascites: None.  IVC: Visualized portions are within normal limits.  IMPRESSION: No cholelithiasis or sonographic evidence of cholecystitis.  < end of copied text > 15.8   5.68  )-----------( 169      ( 13 Jun 2025 20:45 )             48.1     136  |  97[L]  |  9   ----------------------------<  316[H]     06-13  4.4   |  25  |  1.11    Ca    9.4      13 Jun 2025 20:45    TPro  7.4  /  Alb  3.9  /  TBili  0.3  /  DBili  x   /  AST  9   /  ALT  7   /  AlkPhos  80  06-13    Lipase: 594 U/L (06.13.25 @ 20:45)    20:45 - VBG - pH: 7.37  | pCO2: 54    | pO2: 25    | Lactate: 1.2      hs Troponin, T - 10 ng/L (06-13-25 @ 20:45)    Lipid Profile (06.14.25 @ 04:05)    Cholesterol: 188 mg/dL    HDL Cholesterol: 44 mg/dL    Non HDL Cholesterol: 144 mg/dL    LDL Cholesterol Calculated: 126 mg/dL    < from: CT Angio Chest Aorta w/wo IV Cont (06.13.25 @ 22:44) >/< from: CT Angio Abdomen and Pelvis w/ IV Cont (06.13.25 @ 22:45) >  Aorta angiogram: No aortic intramural hematoma, aneurysm, or evidence of acute dissection.  Lymph nodes: No lymphadenopathy.  Heart/vasculature: Heart size is normal. No pericardial effusion. No central pulmonary embolus.  Airways/lungs/pleura: Patent central airways. No focal pulmonary consolidation. No pleural effusion.  LIVER: Within normal limits.  BILE DUCTS: Normal caliber.  GALLBLADDER: Within normal limits.  SPLEEN: Within normal limits.  PANCREAS: Fullness of the pancreatic head with mild edema and peripancreatic inflammation. No pancreatic ductal dilatation. No areas of pancreatic overt hypoenhancement.  ADRENALS: Within normal limits.  KIDNEYS/URETERS: No hydronephrosis. 5.2 cm left renal cyst  BLADDER: Within normal limits.  REPRODUCTIVE ORGANS: Prostate is enlarged.  BOWEL: No bowel obstruction. Appendix is normal.  PERITONEUM: No ascites.  VESSELS:  Atherosclerotic change. No aortic dissection or aneurysm.  RETROPERITONEUM: No lymphadenopathy.  ABDOMINAL WALL: 1 no fat-containing umbilical hernia and additional fat-containing supraumbilical hernia.  BONES: Mild degenerative change.  IMPRESSION: No aortic dissection or aneurysm. Constellation of findings concerning for interstitial edematous pancreatitis most prominent involving the pancreatic head. Given relatively isolated involvement of the pancreatic head, short-term follow-up contrast-enhanced MRI may be obtained to evaluate for underlying lesion, if no contraindications exist.  < end of copied text >    < from: US Abdomen Upper Quadrant Right (06.13.25 @ 23:37) >  Liver: Increased echogenicity.  Bile ducts: Normal caliber. Common bile duct measures 4 mm.  Gallbladder: Within normal limits. No stones or gallbladder wall   thickening. No sonographic Valencia's sign.  Pancreas: Poorly visualized.  Right kidney: 11.6 cm. No hydronephrosis.  Ascites: None.  IVC: Visualized portions are within normal limits.  IMPRESSION: No cholelithiasis or sonographic evidence of cholecystitis.  < end of copied text >    EKG personally reviewed and interpreted - NSR 63bpm, biphasic T in III, QTc 421ms (unchanged from prior)

## 2025-06-14 NOTE — ED ADULT NURSE REASSESSMENT NOTE - NS ED NURSE REASSESS COMMENT FT1
Resting on stretcher. Not in acute distress. alert and oriented x4, safety maintained. c/o abdominal pain, Inj Morphine given as per order. VSS. NSR on tele. nursing care is ongoing.

## 2025-06-15 DIAGNOSIS — I10 ESSENTIAL (PRIMARY) HYPERTENSION: ICD-10-CM

## 2025-06-15 LAB
ALBUMIN SERPL ELPH-MCNC: 3.8 G/DL — SIGNIFICANT CHANGE UP (ref 3.3–5)
ALP SERPL-CCNC: 82 U/L — SIGNIFICANT CHANGE UP (ref 40–120)
ALT FLD-CCNC: 8 U/L — SIGNIFICANT CHANGE UP (ref 4–41)
ANION GAP SERPL CALC-SCNC: 16 MMOL/L — HIGH (ref 7–14)
AST SERPL-CCNC: 10 U/L — SIGNIFICANT CHANGE UP (ref 4–40)
BILIRUB SERPL-MCNC: 0.5 MG/DL — SIGNIFICANT CHANGE UP (ref 0.2–1.2)
BUN SERPL-MCNC: 7 MG/DL — SIGNIFICANT CHANGE UP (ref 7–23)
CALCIUM SERPL-MCNC: 9.4 MG/DL — SIGNIFICANT CHANGE UP (ref 8.4–10.5)
CHLORIDE SERPL-SCNC: 97 MMOL/L — LOW (ref 98–107)
CO2 SERPL-SCNC: 21 MMOL/L — LOW (ref 22–31)
CREAT SERPL-MCNC: 0.84 MG/DL — SIGNIFICANT CHANGE UP (ref 0.5–1.3)
EGFR: 97 ML/MIN/1.73M2 — SIGNIFICANT CHANGE UP
EGFR: 97 ML/MIN/1.73M2 — SIGNIFICANT CHANGE UP
GLUCOSE BLDC GLUCOMTR-MCNC: 200 MG/DL — HIGH (ref 70–99)
GLUCOSE BLDC GLUCOMTR-MCNC: 216 MG/DL — HIGH (ref 70–99)
GLUCOSE BLDC GLUCOMTR-MCNC: 218 MG/DL — HIGH (ref 70–99)
GLUCOSE BLDC GLUCOMTR-MCNC: 224 MG/DL — HIGH (ref 70–99)
GLUCOSE BLDC GLUCOMTR-MCNC: 239 MG/DL — HIGH (ref 70–99)
GLUCOSE SERPL-MCNC: 219 MG/DL — HIGH (ref 70–99)
HCT VFR BLD CALC: 47.8 % — SIGNIFICANT CHANGE UP (ref 39–50)
HGB BLD-MCNC: 15.7 G/DL — SIGNIFICANT CHANGE UP (ref 13–17)
LIDOCAIN IGE QN: 55 U/L — SIGNIFICANT CHANGE UP (ref 7–60)
MAGNESIUM SERPL-MCNC: 1.8 MG/DL — SIGNIFICANT CHANGE UP (ref 1.6–2.6)
MCHC RBC-ENTMCNC: 27.4 PG — SIGNIFICANT CHANGE UP (ref 27–34)
MCHC RBC-ENTMCNC: 32.8 G/DL — SIGNIFICANT CHANGE UP (ref 32–36)
MCV RBC AUTO: 83.3 FL — SIGNIFICANT CHANGE UP (ref 80–100)
NRBC # BLD AUTO: 0 K/UL — SIGNIFICANT CHANGE UP (ref 0–0)
NRBC # FLD: 0 K/UL — SIGNIFICANT CHANGE UP (ref 0–0)
NRBC BLD AUTO-RTO: 0 /100 WBCS — SIGNIFICANT CHANGE UP (ref 0–0)
PHOSPHATE SERPL-MCNC: 3.2 MG/DL — SIGNIFICANT CHANGE UP (ref 2.5–4.5)
PLATELET # BLD AUTO: 180 K/UL — SIGNIFICANT CHANGE UP (ref 150–400)
POTASSIUM SERPL-MCNC: 4.3 MMOL/L — SIGNIFICANT CHANGE UP (ref 3.5–5.3)
POTASSIUM SERPL-SCNC: 4.3 MMOL/L — SIGNIFICANT CHANGE UP (ref 3.5–5.3)
PROT SERPL-MCNC: 7.2 G/DL — SIGNIFICANT CHANGE UP (ref 6–8.3)
RBC # BLD: 5.74 M/UL — SIGNIFICANT CHANGE UP (ref 4.2–5.8)
RBC # FLD: 12.8 % — SIGNIFICANT CHANGE UP (ref 10.3–14.5)
SODIUM SERPL-SCNC: 134 MMOL/L — LOW (ref 135–145)
WBC # BLD: 4.31 K/UL — SIGNIFICANT CHANGE UP (ref 3.8–10.5)
WBC # FLD AUTO: 4.31 K/UL — SIGNIFICANT CHANGE UP (ref 3.8–10.5)

## 2025-06-15 PROCEDURE — 99232 SBSQ HOSP IP/OBS MODERATE 35: CPT

## 2025-06-15 PROCEDURE — 99222 1ST HOSP IP/OBS MODERATE 55: CPT

## 2025-06-15 RX ORDER — ATORVASTATIN CALCIUM 80 MG/1
20 TABLET, FILM COATED ORAL AT BEDTIME
Refills: 0 | Status: DISCONTINUED | OUTPATIENT
Start: 2025-06-15 | End: 2025-06-17

## 2025-06-15 RX ORDER — ACETAMINOPHEN 500 MG/5ML
1000 LIQUID (ML) ORAL ONCE
Refills: 0 | Status: COMPLETED | OUTPATIENT
Start: 2025-06-15 | End: 2025-06-15

## 2025-06-15 RX ORDER — INSULIN GLARGINE-YFGN 100 [IU]/ML
18 INJECTION, SOLUTION SUBCUTANEOUS AT BEDTIME
Refills: 0 | Status: DISCONTINUED | OUTPATIENT
Start: 2025-06-15 | End: 2025-06-16

## 2025-06-15 RX ADMIN — INSULIN LISPRO 1: 100 INJECTION, SOLUTION INTRAVENOUS; SUBCUTANEOUS at 09:01

## 2025-06-15 RX ADMIN — Medication 1000 MILLIGRAM(S): at 19:30

## 2025-06-15 RX ADMIN — INSULIN GLARGINE-YFGN 18 UNIT(S): 100 INJECTION, SOLUTION SUBCUTANEOUS at 23:00

## 2025-06-15 RX ADMIN — INSULIN LISPRO 5 UNIT(S): 100 INJECTION, SOLUTION INTRAVENOUS; SUBCUTANEOUS at 17:30

## 2025-06-15 RX ADMIN — INSULIN LISPRO 2: 100 INJECTION, SOLUTION INTRAVENOUS; SUBCUTANEOUS at 12:55

## 2025-06-15 RX ADMIN — INSULIN LISPRO 2: 100 INJECTION, SOLUTION INTRAVENOUS; SUBCUTANEOUS at 17:30

## 2025-06-15 RX ADMIN — Medication 2 MILLIGRAM(S): at 17:53

## 2025-06-15 RX ADMIN — INSULIN LISPRO 5 UNIT(S): 100 INJECTION, SOLUTION INTRAVENOUS; SUBCUTANEOUS at 12:54

## 2025-06-15 RX ADMIN — ATORVASTATIN CALCIUM 20 MILLIGRAM(S): 80 TABLET, FILM COATED ORAL at 23:00

## 2025-06-15 RX ADMIN — ENOXAPARIN SODIUM 40 MILLIGRAM(S): 100 INJECTION SUBCUTANEOUS at 17:31

## 2025-06-15 RX ADMIN — Medication 400 MILLIGRAM(S): at 19:08

## 2025-06-15 RX ADMIN — Medication 2 MILLIGRAM(S): at 17:23

## 2025-06-15 NOTE — PROGRESS NOTE ADULT - PROBLEM SELECTOR PLAN 3
on lipitor 10 mg qhs   , not at goal    will likely need higher dose of Lipitor on lipitor 10 mg qhs   , not at goal    will likely need higher dose of Lipitor, will increase to 20 mg per endo recs

## 2025-06-15 NOTE — PROGRESS NOTE ADULT - PROBLEM SELECTOR PLAN 2
holding oral meds  FS QAC/HS w/ISS  A1c 10.5   pt will benefit from Endo eval holding oral meds  FS QAC/HS w/ISS  A1c 10.5   Endo eval appreciated ,  recommend Lantus 18 U and ADMELOG 5 U premeal if diet advanced   diabetic  education   Nutrition eval ,

## 2025-06-15 NOTE — CONSULT NOTE ADULT - ASSESSMENT
A/P: 65 -year-old male with NIDDM2, HTN, HLD, presenting from home with 1 week of intermittent epigastric pain, radiating to the back, described as a "biting pain" that is worse with eating. Denies any associated nausea or vomiting. Patient reports having similar pains in the past but denies any known history of pancreatitis. Endocrinology was consulted for management of diabetes mellitus.    #Type 2 Diabetes Mellitus, uncontrolled  - HbA1c 10.5%; home regimen: jardiance 10 mg, glyburide/metformin 5-1000 mg bid  - on clear liquid diet  - Recommend lantus 18 QHS  - Recommend low dose admelog correction scale TIDQAC and QHS. Can start admelog 5 units premeal if diet advanced today  - Please check FSG before meals and QHS, or q6h while NPO  - RD consult  - hypoglycemia orderset prn  - Discharge planning:  basal-dose tbd+metformin 1 g bid. Hold jardiance until appetite fully back to normal at home.  stop glyburide/metformin combo pill.    #Hypertension  - BP goal <130/80  - Management as per primary team    #Hyperlipidemia  tg normal  recommend increasing atorvastatin 20 mg     will discuss with team    Daisy Dobbins MD  Attending Physician   Department of Endocrinology, Diabetes and Metabolism   [employer] Teams for 06-15-25 @ 11:24.    If before 9AM or after 5PM, or on weekends/holidays, please call the Endocrine answering service for assistance ([phone]).  For nonurgent matters, please email [email or web address] for assistance.     Please note that a different provider may be following this patient each day.          A/P: 65 -year-old male with NIDDM2, HTN, HLD, presenting from home with 1 week of intermittent epigastric pain, radiating to the back, described as a "biting pain" that is worse with eating. Denies any associated nausea or vomiting. Patient reports having similar pains in the past but denies any known history of pancreatitis. Endocrinology was consulted for management of diabetes mellitus.    #Type 2 Diabetes Mellitus, uncontrolled  - HbA1c 10.5%; home regimen: glyburide/metformin 2.5-500 mg bid. NOT taking jardiance (in med rec), does not want to take jardiance concerned about side effects  - on clear liquid diet now solid food  - Recommend lantus 18 QHS  - Recommend low dose admelog correction scale TIDQAC and QHS. Can start admelog 5 units premeals  - Please check FSG before meals and QHS, or q6h while NPO  - RD consult  - hypoglycemia orderset prn  - Discharge planning:  pt does not want insulin.  Discussed importance of lifestyle changes and is motivated to make these changes.  He prefers to continue his current glyburide/metformin pill. We can increase the dose to glyburide/metformin 5-1000 mg bid on discharge. Discussed risk of hypoglycemia.  Cannot get dpp4 or glp1 with pancreatitis presentation.    #Hypertension  - BP goal <130/80  - Management as per primary team    #Hyperlipidemia  tg normal  recommend increasing atorvastatin 20 mg     will discuss with team    Daisy Dobbins MD  Attending Physician   Department of Endocrinology, Diabetes and Metabolism   [employer] Teams for 06-15-25 @ 11:24.    If before 9AM or after 5PM, or on weekends/holidays, please call the Endocrine answering service for assistance ([phone]).  For nonurgent matters, please email [email or web address] for assistance.     Please note that a different provider may be following this patient each day.

## 2025-06-15 NOTE — CONSULT NOTE ADULT - SUBJECTIVE AND OBJECTIVE BOX
HPI:  65 -year-old male with NIDDM2, HTN, HLD, presenting from home with 1 week of intermittent epigastric pain, radiating to the back, described as a "biting pain" that is worse with eating. Denies any associated nausea or vomiting. Patient reports having similar pains in the past but denies any known history of pancreatitis. Patient reports drinking socially 1-2x/week, reports pain worsening after drinking wine earlier this week. Denies any new medications, denies taking any supplements. Only recent travel was to [place] in May.     In the ED VS:  98.6  60-70  132-177/79-87  16-17  %RA, received NS1L IVF, famotidine 20mg IV x1, aluminum hydroxide/magnesium hydroxide/simethicone 30mL PO x1, acetaminophen 1g IVPB x1, morphine 2mg IV x1 and 4mg IV x1 (14 Jun 2025 06:59)      Reason for consult: dm2  HPI:  65 -year-old male with NIDDM2, HTN, HLD, presenting from home with 1 week of intermittent epigastric pain, radiating to the back, described as a "biting pain" that is worse with eating. Denies any associated nausea or vomiting. Patient reports having similar pains in the past but denies any known history of pancreatitis. Endocrinology was consulted for management of diabetes mellitus.    Patient was diagnosed with *** in ***. Has been on insulin for *** years.   Diabetes complications include:  Reports family history of DM in ***.  Denies blurry vision, polyuria, polydipsia, and paresthesias.    Endocrinologist:    Last HbA1c: 10.5    Home DM regimen: jardiance 10 mg, glyburide/metformin 5-1000 mg bid    Inpatient DM regimen: lantus 15 units, low scales    Ordered for clear liquid diet       PAST MEDICAL & SURGICAL HISTORY:  DM (diabetes mellitus)      Hyperlipidemia      No significant past surgical history          FAMILY HISTORY:  No pertinent family history in first degree relatives        Social History:  no tobacco, etoh or drug use    MEDICATIONS  (STANDING):  atorvastatin 10 milliGRAM(s) Oral at bedtime  dextrose 5%. 1000 milliLiter(s) (100 mL/Hr) IV Continuous <Continuous>  dextrose 5%. 1000 milliLiter(s) (50 mL/Hr) IV Continuous <Continuous>  dextrose 50% Injectable 25 Gram(s) IV Push once  dextrose 50% Injectable 12.5 Gram(s) IV Push once  dextrose 50% Injectable 25 Gram(s) IV Push once  enoxaparin Injectable 40 milliGRAM(s) SubCutaneous every 24 hours  glucagon  Injectable 1 milliGRAM(s) IntraMuscular once  insulin glargine Injectable (LANTUS) 18 Unit(s) SubCutaneous at bedtime  insulin lispro (ADMELOG) corrective regimen sliding scale   SubCutaneous three times a day before meals  insulin lispro (ADMELOG) corrective regimen sliding scale   SubCutaneous at bedtime  insulin lispro Injectable (ADMELOG) 5 Unit(s) SubCutaneous three times a day before meals  lactated ringers. 1000 milliLiter(s) (100 mL/Hr) IV Continuous <Continuous>  lactated ringers. 1000 milliLiter(s) (100 mL/Hr) IV Continuous <Continuous>  senna 2 Tablet(s) Oral at bedtime    MEDICATIONS  (PRN):  acetaminophen     Tablet .. 650 milliGRAM(s) Oral every 6 hours PRN Mild Pain (1 - 3)  dextrose Oral Gel 15 Gram(s) Oral once PRN Blood Glucose LESS THAN 70 milliGRAM(s)/deciliter  morphine  - Injectable 2 milliGRAM(s) IV Push every 4 hours PRN Moderate Pain (4 - 6)  morphine  - Injectable 4 milliGRAM(s) IV Push every 4 hours PRN Severe Pain (7 - 10)      Allergies    No Known Allergies    Intolerances        Review of Systems:  Constitutional: No fever, good appetite/po intake  Eyes: No blurry vision, diplopia  Neuro: No tremors  HEENT: No pain  Cardiovascular: No chest pain, palpitations  Respiratory: No SOB, no cough  GI: No nausea, vomiting,   : No dysuria, hematuria  Skin: no rash  Psych: no depression  Endocrine: no polyuria, polydipsia  Hem/lymph: no swelling  Osteoporosis: no fractures    ALL OTHER SYSTEMS REVIEWED AND NEGATIVE    PHYSICAL EXAM:  VITALS: T(C): 37.1 (06-15-25 @ 09:00)  T(F): 98.8 (06-15-25 @ 09:00), Max: 98.8 (06-15-25 @ 09:00)  HR: 68 (06-15-25 @ 09:00) (58 - 68)  BP: 141/77 (06-15-25 @ 09:00) (129/66 - 158/92)  RR:  (18 - 19)  SpO2:  (98% - 100%)  Wt(kg): --  GENERAL: NAD, well-groomed, well-developed  EYES: No proptosis, extraocular movements intact,  no lid lag, anicteric  HEENT:  Atraumatic, Normocephalic, moist mucous membranes  THYROID: Normal size, no palpable nodules, no thyromegaly  RESPIRATORY: Clear to auscultation bilaterally; No rales, rhonchi, wheezing, or rubs  CARDIOVASCULAR: Regular rate and rhythm; No murmurs; no peripheral edema  GI: Soft, nontender, non distended, normal bowel sounds  SKIN: Dry, intact, No rashes or lesions  EXTREMITIES: No foot ulcers, distal pedal pulses intact bilaterally  NEURO: sensation intact, no tremors  PSYCH: reactive affect, euthymic mood  CUSHING'S SIGNS: no striae or visible bruising                              15.7   4.31  )-----------( 180      ( 15 Alex 2025 07:05 )             47.8       06-15    134[L]  |  97[L]  |  7   ----------------------------<  219[H]  4.3   |  21[L]  |  0.84    eGFR: 97    Ca    9.4      06-15  Mg     1.80     06-15  Phos  3.2     06-15    TPro  7.2  /  Alb  3.8  /  TBili  0.5  /  DBili  x   /  AST  10  /  ALT  8   /  AlkPhos  82  06-15      Thyroid Function Tests:      06-14 Chol 188 Direct LDL -- LDL calculated 126[H] HDL 44 Trig 99    Radiology:        HPI:  65 -year-old male with NIDDM2, HTN, HLD, presenting from home with 1 week of intermittent epigastric pain, radiating to the back, described as a "biting pain" that is worse with eating. Denies any associated nausea or vomiting. Patient reports having similar pains in the past but denies any known history of pancreatitis. Patient reports drinking socially 1-2x/week, reports pain worsening after drinking wine earlier this week. Denies any new medications, denies taking any supplements. Only recent travel was to [place] in May.     In the ED VS:  98.6  60-70  132-177/79-87  16-17  %RA, received NS1L IVF, famotidine 20mg IV x1, aluminum hydroxide/magnesium hydroxide/simethicone 30mL PO x1, acetaminophen 1g IVPB x1, morphine 2mg IV x1 and 4mg IV x1 (14 Jun 2025 06:59)      Reason for consult: dm2  HPI:  65 -year-old male with NIDDM2, HTN, HLD, presenting from home with 1 week of intermittent epigastric pain, radiating to the back, described as a "biting pain" that is worse with eating. Denies any associated nausea or vomiting. Patient reports having similar pains in the past but denies any known history of pancreatitis. Endocrinology was consulted for management of diabetes mellitus.    Patient was diagnosed with DM2 15 years ago. follows with pcp. Bill ZARAGOZA. No complications. Notes dietary indiscretion but tries to exercise. Appetite low but improving. Does not want insulin.  Denies blurry vision, polyuria, polydipsia, and paresthesias.    Endocrinologist: none    Last HbA1c: 10.5    Home DM regimen: glyburide/metformin 2.5-500 mg bid. NOT taking jardiance (in med rec), does not want to take jardiance concerned about side effects    Inpatient DM regimen: lantus 15 units, low scales    Ordered for clear liquid diet earlier now advanced to regular food.      PAST MEDICAL & SURGICAL HISTORY:  DM (diabetes mellitus)      Hyperlipidemia      No significant past surgical history          FAMILY HISTORY:  No pertinent family history in first degree relatives        Social History:  no tobacco, etoh or drug use    MEDICATIONS  (STANDING):  atorvastatin 10 milliGRAM(s) Oral at bedtime  dextrose 5%. 1000 milliLiter(s) (100 mL/Hr) IV Continuous <Continuous>  dextrose 5%. 1000 milliLiter(s) (50 mL/Hr) IV Continuous <Continuous>  dextrose 50% Injectable 25 Gram(s) IV Push once  dextrose 50% Injectable 12.5 Gram(s) IV Push once  dextrose 50% Injectable 25 Gram(s) IV Push once  enoxaparin Injectable 40 milliGRAM(s) SubCutaneous every 24 hours  glucagon  Injectable 1 milliGRAM(s) IntraMuscular once  insulin glargine Injectable (LANTUS) 18 Unit(s) SubCutaneous at bedtime  insulin lispro (ADMELOG) corrective regimen sliding scale   SubCutaneous three times a day before meals  insulin lispro (ADMELOG) corrective regimen sliding scale   SubCutaneous at bedtime  insulin lispro Injectable (ADMELOG) 5 Unit(s) SubCutaneous three times a day before meals  lactated ringers. 1000 milliLiter(s) (100 mL/Hr) IV Continuous <Continuous>  lactated ringers. 1000 milliLiter(s) (100 mL/Hr) IV Continuous <Continuous>  senna 2 Tablet(s) Oral at bedtime    MEDICATIONS  (PRN):  acetaminophen     Tablet .. 650 milliGRAM(s) Oral every 6 hours PRN Mild Pain (1 - 3)  dextrose Oral Gel 15 Gram(s) Oral once PRN Blood Glucose LESS THAN 70 milliGRAM(s)/deciliter  morphine  - Injectable 2 milliGRAM(s) IV Push every 4 hours PRN Moderate Pain (4 - 6)  morphine  - Injectable 4 milliGRAM(s) IV Push every 4 hours PRN Severe Pain (7 - 10)      Allergies    No Known Allergies    Intolerances        Review of Systems:  Constitutional: No fever, good appetite/po intake  Eyes: No blurry vision, diplopia  Neuro: No tremors  HEENT: No pain  Cardiovascular: No chest pain, palpitations  Respiratory: No SOB, no cough  GI: No nausea, vomiting,   : No dysuria, hematuria  Skin: no rash  Psych: no depression  Endocrine: no polyuria, polydipsia  Hem/lymph: no swelling  Osteoporosis: no fractures    ALL OTHER SYSTEMS REVIEWED AND NEGATIVE    PHYSICAL EXAM:  VITALS: T(C): 37.1 (06-15-25 @ 09:00)  T(F): 98.8 (06-15-25 @ 09:00), Max: 98.8 (06-15-25 @ 09:00)  HR: 68 (06-15-25 @ 09:00) (58 - 68)  BP: 141/77 (06-15-25 @ 09:00) (129/66 - 158/92)  RR:  (18 - 19)  SpO2:  (98% - 100%)  Wt(kg): --  GENERAL: NAD, well-groomed, well-developed  EYES: No proptosis, extraocular movements intact,  no lid lag, anicteric  HEENT:  Atraumatic, Normocephalic, moist mucous membranes  THYROID: Normal size, no thyromegaly  RESPIRATORY: Clear to auscultation bilaterally; No rales, rhonchi, wheezing, or rubs  CARDIOVASCULAR: Regular rate and rhythm; No murmurs; no peripheral edema  GI: Soft, nontender, non distended, normal bowel sounds  SKIN: Dry, intact, No rashes or lesions  NEURO: sensation intact, no tremors  PSYCH: reactive affect, euthymic mood  CUSHING'S SIGNS: no striae or visible bruising                              15.7   4.31  )-----------( 180      ( 15 Alex 2025 07:05 )             47.8       06-15    134[L]  |  97[L]  |  7   ----------------------------<  219[H]  4.3   |  21[L]  |  0.84    eGFR: 97    Ca    9.4      06-15  Mg     1.80     06-15  Phos  3.2     06-15    TPro  7.2  /  Alb  3.8  /  TBili  0.5  /  DBili  x   /  AST  10  /  ALT  8   /  AlkPhos  82  06-15      Thyroid Function Tests:      06-14 Chol 188 Direct LDL -- LDL calculated 126[H] HDL 44 Trig 99    Radiology:

## 2025-06-15 NOTE — PROGRESS NOTE ADULT - SUBJECTIVE AND OBJECTIVE BOX
Patient is a 65y old  Male who presents with a chief complaint of acute pancreatitis (14 Jun 2025 16:11)      SUBJECTIVE / OVERNIGHT EVENTS:    MEDICATIONS  (STANDING):  atorvastatin 10 milliGRAM(s) Oral at bedtime  dextrose 5%. 1000 milliLiter(s) (100 mL/Hr) IV Continuous <Continuous>  dextrose 5%. 1000 milliLiter(s) (50 mL/Hr) IV Continuous <Continuous>  dextrose 50% Injectable 25 Gram(s) IV Push once  dextrose 50% Injectable 12.5 Gram(s) IV Push once  dextrose 50% Injectable 25 Gram(s) IV Push once  enoxaparin Injectable 40 milliGRAM(s) SubCutaneous every 24 hours  glucagon  Injectable 1 milliGRAM(s) IntraMuscular once  insulin glargine Injectable (LANTUS) 15 Unit(s) SubCutaneous at bedtime  insulin lispro (ADMELOG) corrective regimen sliding scale   SubCutaneous three times a day before meals  insulin lispro (ADMELOG) corrective regimen sliding scale   SubCutaneous at bedtime  insulin lispro Injectable (ADMELOG) 5 Unit(s) SubCutaneous three times a day before meals  lactated ringers. 1000 milliLiter(s) (100 mL/Hr) IV Continuous <Continuous>  lactated ringers. 1000 milliLiter(s) (100 mL/Hr) IV Continuous <Continuous>  senna 2 Tablet(s) Oral at bedtime    MEDICATIONS  (PRN):  acetaminophen     Tablet .. 650 milliGRAM(s) Oral every 6 hours PRN Mild Pain (1 - 3)  dextrose Oral Gel 15 Gram(s) Oral once PRN Blood Glucose LESS THAN 70 milliGRAM(s)/deciliter  morphine  - Injectable 2 milliGRAM(s) IV Push every 4 hours PRN Moderate Pain (4 - 6)  morphine  - Injectable 4 milliGRAM(s) IV Push every 4 hours PRN Severe Pain (7 - 10)      Vital Signs Last 24 Hrs  T(C): 36.6 (15 Alex 2025 02:27), Max: 36.9 (14 Jun 2025 13:20)  T(F): 97.8 (15 Alex 2025 02:27), Max: 98.4 (14 Jun 2025 13:20)  HR: 59 (15 Alex 2025 02:27) (58 - 61)  BP: 158/92 (15 Alex 2025 02:27) (129/66 - 158/92)  BP(mean): --  RR: 18 (15 Alex 2025 02:27) (18 - 18)  SpO2: 98% (15 Alex 2025 02:27) (97% - 100%)    Parameters below as of 15 Alex 2025 02:27  Patient On (Oxygen Delivery Method): room air      CAPILLARY BLOOD GLUCOSE      POCT Blood Glucose.: 200 mg/dL (15 Alex 2025 08:19)  POCT Blood Glucose.: 180 mg/dL (14 Jun 2025 21:34)  POCT Blood Glucose.: 207 mg/dL (14 Jun 2025 16:58)  POCT Blood Glucose.: 211 mg/dL (14 Jun 2025 11:59)    I&O's Summary    14 Jun 2025 07:01  -  15 Alex 2025 07:00  --------------------------------------------------------  IN: 2700 mL / OUT: 0 mL / NET: 2700 mL        PHYSICAL EXAM:  GENERAL: NAD, well-developed  HEAD:  Atraumatic, Normocephalic  EYES: EOMI, PERRLA, conjunctiva and sclera clear  NECK: Supple, No JVD  CHEST/LUNG: Clear to auscultation bilaterally; No wheeze  HEART: Regular rate and rhythm; No murmurs, rubs, or gallops  ABDOMEN: Soft, Nontender, Nondistended; Bowel sounds present  EXTREMITIES:  2+ Peripheral Pulses, No clubbing, cyanosis, or edema  PSYCH: AAOx3  NEUROLOGY: non-focal  SKIN: No rashes or lesions    LABS:                        15.7   4.31  )-----------( 180      ( 15 Alex 2025 07:05 )             47.8     06-15    134[L]  |  97[L]  |  7   ----------------------------<  219[H]  4.3   |  21[L]  |  0.84    Ca    9.4      15 Alex 2025 07:05  Phos  3.2     06-15  Mg     1.80     06-15    TPro  7.2  /  Alb  3.8  /  TBili  0.5  /  DBili  x   /  AST  10  /  ALT  8   /  AlkPhos  82  06-15          Urinalysis Basic - ( 15 Alex 2025 07:05 )    Color: x / Appearance: x / SG: x / pH: x  Gluc: 219 mg/dL / Ketone: x  / Bili: x / Urobili: x   Blood: x / Protein: x / Nitrite: x   Leuk Esterase: x / RBC: x / WBC x   Sq Epi: x / Non Sq Epi: x / Bacteria: x        RADIOLOGY & ADDITIONAL TESTS:    Imaging Personally Reviewed:    Consultant(s) Notes Reviewed:      Care Discussed with Consultants/Other Providers:   Patient is a 65y old  Male who presents with a chief complaint of acute pancreatitis (14 Jun 2025 16:11)      SUBJECTIVE / OVERNIGHT EVENTS: patient seen and examined by bedside, pt feels slightly better , still have abdominal discomfort , however not radiating to the back , denies nausea and vomiting     MEDICATIONS  (STANDING):  atorvastatin 10 milliGRAM(s) Oral at bedtime  dextrose 5%. 1000 milliLiter(s) (100 mL/Hr) IV Continuous <Continuous>  dextrose 5%. 1000 milliLiter(s) (50 mL/Hr) IV Continuous <Continuous>  dextrose 50% Injectable 25 Gram(s) IV Push once  dextrose 50% Injectable 12.5 Gram(s) IV Push once  dextrose 50% Injectable 25 Gram(s) IV Push once  enoxaparin Injectable 40 milliGRAM(s) SubCutaneous every 24 hours  glucagon  Injectable 1 milliGRAM(s) IntraMuscular once  insulin glargine Injectable (LANTUS) 15 Unit(s) SubCutaneous at bedtime  insulin lispro (ADMELOG) corrective regimen sliding scale   SubCutaneous three times a day before meals  insulin lispro (ADMELOG) corrective regimen sliding scale   SubCutaneous at bedtime  insulin lispro Injectable (ADMELOG) 5 Unit(s) SubCutaneous three times a day before meals  lactated ringers. 1000 milliLiter(s) (100 mL/Hr) IV Continuous <Continuous>  lactated ringers. 1000 milliLiter(s) (100 mL/Hr) IV Continuous <Continuous>  senna 2 Tablet(s) Oral at bedtime    MEDICATIONS  (PRN):  acetaminophen     Tablet .. 650 milliGRAM(s) Oral every 6 hours PRN Mild Pain (1 - 3)  dextrose Oral Gel 15 Gram(s) Oral once PRN Blood Glucose LESS THAN 70 milliGRAM(s)/deciliter  morphine  - Injectable 2 milliGRAM(s) IV Push every 4 hours PRN Moderate Pain (4 - 6)  morphine  - Injectable 4 milliGRAM(s) IV Push every 4 hours PRN Severe Pain (7 - 10)      Vital Signs Last 24 Hrs  T(C): 36.6 (15 Alex 2025 02:27), Max: 36.9 (14 Jun 2025 13:20)  T(F): 97.8 (15 Alex 2025 02:27), Max: 98.4 (14 Jun 2025 13:20)  HR: 59 (15 Alex 2025 02:27) (58 - 61)  BP: 158/92 (15 Alex 2025 02:27) (129/66 - 158/92)  BP(mean): --  RR: 18 (15 Alex 2025 02:27) (18 - 18)  SpO2: 98% (15 Alex 2025 02:27) (97% - 100%)    Parameters below as of 15 Alex 2025 02:27  Patient On (Oxygen Delivery Method): room air      CAPILLARY BLOOD GLUCOSE      POCT Blood Glucose.: 200 mg/dL (15 Alex 2025 08:19)  POCT Blood Glucose.: 180 mg/dL (14 Jun 2025 21:34)  POCT Blood Glucose.: 207 mg/dL (14 Jun 2025 16:58)  POCT Blood Glucose.: 211 mg/dL (14 Jun 2025 11:59)    I&O's Summary    14 Jun 2025 07:01  -  15 Alex 2025 07:00  --------------------------------------------------------  IN: 2700 mL / OUT: 0 mL / NET: 2700 mL      PHYSICAL EXAM:  GENERAL: NAD,  HEAD:  Atraumatic, Normocephalic  EYES: EOMI, PERRLA, conjunctiva and sclera clear  NECK: Supple, No JVD  CHEST/LUNG: Clear to auscultation bilaterally; No wheeze  HEART: Regular rate and rhythm; No murmurs, rubs, or gallops  ABDOMEN: Soft, epigastric tenderness , Nondistended; Bowel sounds present  EXTREMITIES:  2+ Peripheral Pulses, No clubbing, cyanosis, or edema  PSYCH: AAOx3  NEUROLOGY: non-focal  SKIN: No rashes or lesions      LABS:                        15.7   4.31  )-----------( 180      ( 15 Alex 2025 07:05 )             47.8     06-15    134[L]  |  97[L]  |  7   ----------------------------<  219[H]  4.3   |  21[L]  |  0.84    Ca    9.4      15 Alex 2025 07:05  Phos  3.2     06-15  Mg     1.80     06-15    TPro  7.2  /  Alb  3.8  /  TBili  0.5  /  DBili  x   /  AST  10  /  ALT  8   /  AlkPhos  82  06-15          Urinalysis Basic - ( 15 Alex 2025 07:05 )    Color: x / Appearance: x / SG: x / pH: x  Gluc: 219 mg/dL / Ketone: x  / Bili: x / Urobili: x   Blood: x / Protein: x / Nitrite: x   Leuk Esterase: x / RBC: x / WBC x   Sq Epi: x / Non Sq Epi: x / Bacteria: x        RADIOLOGY & ADDITIONAL TESTS:    Imaging Personally Reviewed:    Consultant(s) Notes Reviewed:  Endo     Care Discussed with Consultants/Other Providers:

## 2025-06-16 LAB
ALBUMIN SERPL ELPH-MCNC: 3.9 G/DL — SIGNIFICANT CHANGE UP (ref 3.3–5)
ALP SERPL-CCNC: 92 U/L — SIGNIFICANT CHANGE UP (ref 40–120)
ALT FLD-CCNC: 8 U/L — SIGNIFICANT CHANGE UP (ref 4–41)
ANION GAP SERPL CALC-SCNC: 17 MMOL/L — HIGH (ref 7–14)
AST SERPL-CCNC: 10 U/L — SIGNIFICANT CHANGE UP (ref 4–40)
BILIRUB SERPL-MCNC: 0.4 MG/DL — SIGNIFICANT CHANGE UP (ref 0.2–1.2)
BUN SERPL-MCNC: 9 MG/DL — SIGNIFICANT CHANGE UP (ref 7–23)
CALCIUM SERPL-MCNC: 9.5 MG/DL — SIGNIFICANT CHANGE UP (ref 8.4–10.5)
CHLORIDE SERPL-SCNC: 97 MMOL/L — LOW (ref 98–107)
CO2 SERPL-SCNC: 23 MMOL/L — SIGNIFICANT CHANGE UP (ref 22–31)
CREAT SERPL-MCNC: 1 MG/DL — SIGNIFICANT CHANGE UP (ref 0.5–1.3)
EGFR: 84 ML/MIN/1.73M2 — SIGNIFICANT CHANGE UP
EGFR: 84 ML/MIN/1.73M2 — SIGNIFICANT CHANGE UP
GLUCOSE BLDC GLUCOMTR-MCNC: 207 MG/DL — HIGH (ref 70–99)
GLUCOSE BLDC GLUCOMTR-MCNC: 220 MG/DL — HIGH (ref 70–99)
GLUCOSE BLDC GLUCOMTR-MCNC: 230 MG/DL — HIGH (ref 70–99)
GLUCOSE BLDC GLUCOMTR-MCNC: 267 MG/DL — HIGH (ref 70–99)
GLUCOSE SERPL-MCNC: 230 MG/DL — HIGH (ref 70–99)
HCT VFR BLD CALC: 52.3 % — HIGH (ref 39–50)
HGB BLD-MCNC: 17 G/DL — SIGNIFICANT CHANGE UP (ref 13–17)
MAGNESIUM SERPL-MCNC: 1.9 MG/DL — SIGNIFICANT CHANGE UP (ref 1.6–2.6)
MCHC RBC-ENTMCNC: 27.6 PG — SIGNIFICANT CHANGE UP (ref 27–34)
MCHC RBC-ENTMCNC: 32.5 G/DL — SIGNIFICANT CHANGE UP (ref 32–36)
MCV RBC AUTO: 84.9 FL — SIGNIFICANT CHANGE UP (ref 80–100)
NRBC # BLD AUTO: 0 K/UL — SIGNIFICANT CHANGE UP (ref 0–0)
NRBC # FLD: 0 K/UL — SIGNIFICANT CHANGE UP (ref 0–0)
NRBC BLD AUTO-RTO: 0 /100 WBCS — SIGNIFICANT CHANGE UP (ref 0–0)
PHOSPHATE SERPL-MCNC: 3.1 MG/DL — SIGNIFICANT CHANGE UP (ref 2.5–4.5)
PLATELET # BLD AUTO: 196 K/UL — SIGNIFICANT CHANGE UP (ref 150–400)
POTASSIUM SERPL-MCNC: 3.8 MMOL/L — SIGNIFICANT CHANGE UP (ref 3.5–5.3)
POTASSIUM SERPL-SCNC: 3.8 MMOL/L — SIGNIFICANT CHANGE UP (ref 3.5–5.3)
PROT SERPL-MCNC: 7.8 G/DL — SIGNIFICANT CHANGE UP (ref 6–8.3)
RBC # BLD: 6.16 M/UL — HIGH (ref 4.2–5.8)
RBC # FLD: 12.9 % — SIGNIFICANT CHANGE UP (ref 10.3–14.5)
SODIUM SERPL-SCNC: 137 MMOL/L — SIGNIFICANT CHANGE UP (ref 135–145)
WBC # BLD: 4.79 K/UL — SIGNIFICANT CHANGE UP (ref 3.8–10.5)
WBC # FLD AUTO: 4.79 K/UL — SIGNIFICANT CHANGE UP (ref 3.8–10.5)

## 2025-06-16 PROCEDURE — 99232 SBSQ HOSP IP/OBS MODERATE 35: CPT

## 2025-06-16 RX ORDER — EMPAGLIFLOZIN 25 MG/1
1 TABLET, FILM COATED ORAL
Refills: 0 | DISCHARGE

## 2025-06-16 RX ORDER — GLYBURIDE AND METFORMIN HYDROCHLORIDE 5; 500 MG/1; MG/1
2 TABLET, FILM COATED ORAL
Refills: 0 | DISCHARGE

## 2025-06-16 RX ORDER — METFORMIN HYDROCHLORIDE 850 MG/1
1 TABLET ORAL
Qty: 60 | Refills: 0
Start: 2025-06-16 | End: 2025-07-15

## 2025-06-16 RX ORDER — SENNA 187 MG
2 TABLET ORAL
Qty: 60 | Refills: 0
Start: 2025-06-16 | End: 2025-07-15

## 2025-06-16 RX ORDER — INSULIN LISPRO 100 U/ML
8 INJECTION, SOLUTION INTRAVENOUS; SUBCUTANEOUS
Refills: 0 | Status: DISCONTINUED | OUTPATIENT
Start: 2025-06-16 | End: 2025-06-17

## 2025-06-16 RX ORDER — ATORVASTATIN CALCIUM 80 MG/1
1 TABLET, FILM COATED ORAL
Refills: 0 | DISCHARGE

## 2025-06-16 RX ORDER — INSULIN GLARGINE-YFGN 100 [IU]/ML
24 INJECTION, SOLUTION SUBCUTANEOUS AT BEDTIME
Refills: 0 | Status: DISCONTINUED | OUTPATIENT
Start: 2025-06-16 | End: 2025-06-16

## 2025-06-16 RX ORDER — INSULIN GLARGINE-YFGN 100 [IU]/ML
22 INJECTION, SOLUTION SUBCUTANEOUS
Qty: 5 | Refills: 0
Start: 2025-06-16 | End: 2025-07-15

## 2025-06-16 RX ORDER — ATORVASTATIN CALCIUM 80 MG/1
1 TABLET, FILM COATED ORAL
Qty: 30 | Refills: 0
Start: 2025-06-16 | End: 2025-07-15

## 2025-06-16 RX ORDER — INSULIN GLARGINE-YFGN 100 [IU]/ML
22 INJECTION, SOLUTION SUBCUTANEOUS AT BEDTIME
Refills: 0 | Status: DISCONTINUED | OUTPATIENT
Start: 2025-06-16 | End: 2025-06-17

## 2025-06-16 RX ORDER — ACETAMINOPHEN 500 MG/5ML
2 LIQUID (ML) ORAL
Qty: 0 | Refills: 0 | DISCHARGE
Start: 2025-06-16

## 2025-06-16 RX ADMIN — INSULIN LISPRO 8 UNIT(S): 100 INJECTION, SOLUTION INTRAVENOUS; SUBCUTANEOUS at 17:34

## 2025-06-16 RX ADMIN — INSULIN LISPRO 8 UNIT(S): 100 INJECTION, SOLUTION INTRAVENOUS; SUBCUTANEOUS at 12:55

## 2025-06-16 RX ADMIN — Medication 1 MILLIGRAM(S): at 21:51

## 2025-06-16 RX ADMIN — Medication 650 MILLIGRAM(S): at 14:15

## 2025-06-16 RX ADMIN — ENOXAPARIN SODIUM 40 MILLIGRAM(S): 100 INJECTION SUBCUTANEOUS at 17:32

## 2025-06-16 RX ADMIN — INSULIN LISPRO 2: 100 INJECTION, SOLUTION INTRAVENOUS; SUBCUTANEOUS at 08:42

## 2025-06-16 RX ADMIN — INSULIN LISPRO 2: 100 INJECTION, SOLUTION INTRAVENOUS; SUBCUTANEOUS at 17:33

## 2025-06-16 RX ADMIN — Medication 1 MILLIGRAM(S): at 22:21

## 2025-06-16 RX ADMIN — INSULIN LISPRO 5 UNIT(S): 100 INJECTION, SOLUTION INTRAVENOUS; SUBCUTANEOUS at 08:43

## 2025-06-16 RX ADMIN — Medication 650 MILLIGRAM(S): at 13:45

## 2025-06-16 RX ADMIN — ATORVASTATIN CALCIUM 20 MILLIGRAM(S): 80 TABLET, FILM COATED ORAL at 21:52

## 2025-06-16 RX ADMIN — INSULIN GLARGINE-YFGN 22 UNIT(S): 100 INJECTION, SOLUTION SUBCUTANEOUS at 21:52

## 2025-06-16 RX ADMIN — INSULIN LISPRO 3: 100 INJECTION, SOLUTION INTRAVENOUS; SUBCUTANEOUS at 12:54

## 2025-06-16 RX ADMIN — Medication 2 TABLET(S): at 21:52

## 2025-06-16 NOTE — PROGRESS NOTE ADULT - SUBJECTIVE AND OBJECTIVE BOX
Patient is a 65y old  Male who presents with a chief complaint of acute pancreatitis (16 Jun 2025 14:14)    SUBJECTIVE / OVERNIGHT EVENTS:    pain better, tolerating diet, BM on 6/15  reports wants the MRI to know extent of it  reports no new meds, minimal wine use, no known gallstones  no prior episodes   DM in past was not that bad per patient, does not want to get addicted to insulin     MEDICATIONS  (STANDING):  atorvastatin 20 milliGRAM(s) Oral at bedtime  enoxaparin Injectable 40 milliGRAM(s) SubCutaneous every 24 hours  insulin glargine Injectable (LANTUS) 22 Unit(s) SubCutaneous at bedtime  insulin lispro (ADMELOG) corrective regimen sliding scale   SubCutaneous three times a day before meals  insulin lispro (ADMELOG) corrective regimen sliding scale   SubCutaneous at bedtime  insulin lispro Injectable (ADMELOG) 8 Unit(s) SubCutaneous three times a day before meals  senna 2 Tablet(s) Oral at bedtime    MEDICATIONS  (PRN):  acetaminophen     Tablet .. 650 milliGRAM(s) Oral every 6 hours PRN Mild Pain (1 - 3)  dextrose Oral Gel 15 Gram(s) Oral once PRN Blood Glucose LESS THAN 70 milliGRAM(s)/deciliter    T(C): 36.8 (06-16-25 @ 18:10), Ma: 37 (06-16-25 @ 06:00)  HR: 64 (06-16-25 @ 18:10) (53 - 74)  BP: 138/91 (06-16-25 @ 18:10) (136/85 - 178/62)  RR: 19 (06-16-25 @ 18:10) (18 - 19)  SpO2: 99% (06-16-25 @ 18:10) (98% - 99%)    CAPILLARY BLOOD GLUCOSE  POCT Blood Glucose.: 207 mg/dL (16 Jun 2025 17:08)  POCT Blood Glucose.: 267 mg/dL (16 Jun 2025 12:15)  POCT Blood Glucose.: 220 mg/dL (16 Jun 2025 08:28)  POCT Blood Glucose.: 239 mg/dL (15 Alex 2025 22:44)  POCT Blood Glucose.: 224 mg/dL (15 Alex 2025 21:44)    I&O's Summary    15 Alex 2025 07:01  -  16 Jun 2025 07:00  --------------------------------------------------------  IN: 1780 mL / OUT: 1300 mL / NET: 480 mL    16 Jun 2025 07:01  -  16 Jun 2025 18:57  --------------------------------------------------------  IN: 280 mL / OUT: 0 mL / NET: 280 mL    PHYSICAL EXAM:  GENERAL: NAD   CHEST/LUNG: Clear to auscultation bilaterally; No wheeze  HEART: Regular rate and rhythm; No murmurs, rubs, or gallops  ABDOMEN: Soft, Nontender, Nondistended; Bowel sounds present  EXTREMITIES:   warm and well perfused, No clubbing, cyanosis, or edema  PSYCH: AAOx3  NEUROLOGY: non-focal  SKIN: No rashes or lesions    LABS:                        17.0   4.79  )-----------( 196      ( 16 Jun 2025 06:30 )             52.3     06-16    137  |  97[L]  |  9   ----------------------------<  230[H]  3.8   |  23  |  1.00    Ca    9.5      16 Jun 2025 06:30  Phos  3.1     06-16  Mg     1.90     06-16    TPro  7.8  /  Alb  3.9  /  TBili  0.4  /  DBili  x   /  AST  10  /  ALT  8   /  AlkPhos  92  06-16    Care Discussed with Consultants/Other Providers:

## 2025-06-16 NOTE — PROVIDER CONTACT NOTE (OTHER) - ASSESSMENT
aox4, vss, complaining of pain
Hypertensive 189/94. All other vitals wnl. Aside from mild pain, patient asymptomatic

## 2025-06-16 NOTE — DIETITIAN INITIAL EVALUATION ADULT - PERTINENT MEDS FT
MEDICATIONS  (STANDING):  atorvastatin 20 milliGRAM(s) Oral at bedtime  dextrose 50% Injectable 25 Gram(s) IV Push once  dextrose 50% Injectable 12.5 Gram(s) IV Push once  dextrose 50% Injectable 25 Gram(s) IV Push once  enoxaparin Injectable 40 milliGRAM(s) SubCutaneous every 24 hours  insulin glargine Injectable (LANTUS) 24 Unit(s) SubCutaneous at bedtime  insulin lispro (ADMELOG) corrective regimen sliding scale   SubCutaneous three times a day before meals  insulin lispro (ADMELOG) corrective regimen sliding scale   SubCutaneous at bedtime  insulin lispro Injectable (ADMELOG) 8 Unit(s) SubCutaneous three times a day before meals  senna 2 Tablet(s) Oral at bedtime    MEDICATIONS  (PRN):  acetaminophen     Tablet .. 650 milliGRAM(s) Oral every 6 hours PRN Mild Pain (1 - 3)  dextrose Oral Gel 15 Gram(s) Oral once PRN Blood Glucose LESS THAN 70 milliGRAM(s)/deciliter

## 2025-06-16 NOTE — PROGRESS NOTE ADULT - SUBJECTIVE AND OBJECTIVE BOX
Chief Complaint: DM type 2     Interval Events: FS above goal. Good appetite. No N/V or abdominal pain.     MEDICATIONS  (STANDING):  atorvastatin 20 milliGRAM(s) Oral at bedtime  dextrose 50% Injectable 25 Gram(s) IV Push once  dextrose 50% Injectable 12.5 Gram(s) IV Push once  dextrose 50% Injectable 25 Gram(s) IV Push once  enoxaparin Injectable 40 milliGRAM(s) SubCutaneous every 24 hours  insulin glargine Injectable (LANTUS) 22 Unit(s) SubCutaneous at bedtime  insulin lispro (ADMELOG) corrective regimen sliding scale   SubCutaneous three times a day before meals  insulin lispro (ADMELOG) corrective regimen sliding scale   SubCutaneous at bedtime  insulin lispro Injectable (ADMELOG) 8 Unit(s) SubCutaneous three times a day before meals  senna 2 Tablet(s) Oral at bedtime    MEDICATIONS  (PRN):  acetaminophen     Tablet .. 650 milliGRAM(s) Oral every 6 hours PRN Mild Pain (1 - 3)  dextrose Oral Gel 15 Gram(s) Oral once PRN Blood Glucose LESS THAN 70 milliGRAM(s)/deciliter      Allergies    No Known Allergies    Intolerances      Review of Systems:  Constitutional: No fever/chills   Eyes: No blurry vision  Neuro: No tremors  HEENT: No pain  Cardiovascular: No chest pain, no palpitations  Respiratory: No SOB, no cough  GI: No nausea, vomiting or abdominal pain  : No dysuria  Endocrine: no polyuria, polydipsia      VITALS: T(C): 36.8 (06-16-25 @ 10:25)  T(F): 98.2 (06-16-25 @ 10:25), Max: 98.6 (06-16-25 @ 06:00)  HR: 74 (06-16-25 @ 10:25) (53 - 74)  BP: 136/85 (06-16-25 @ 10:25) (136/85 - 189/94)  RR:  (18 - 18)  SpO2:  (98% - 100%)  Wt(kg): --      Physical Exam:   GENERAL: NAD, well-groomed, well-developed  EYES: No proptosis, no lid lag, anicteric  HEENT:  Atraumatic, Normocephalic, moist mucous membranes  RESPIRATORY: non labored breathing   GI: Soft, nontender, non distended  MUSCULOSKELETAL: Full range of motion, normal strength  NEURO: A&Ox3    CAPILLARY BLOOD GLUCOSE      POCT Blood Glucose.: 267 mg/dL (16 Jun 2025 12:15)  POCT Blood Glucose.: 220 mg/dL (16 Jun 2025 08:28)  POCT Blood Glucose.: 239 mg/dL (15 Alex 2025 22:44)  POCT Blood Glucose.: 224 mg/dL (15 Alex 2025 21:44)  POCT Blood Glucose.: 218 mg/dL (15 Alex 2025 17:29)      06-16    137  |  97[L]  |  9   ----------------------------<  230[H]  3.8   |  23  |  1.00    eGFR: 84    Ca    9.5      06-16  Mg     1.90     06-16  Phos  3.1     06-16    TPro  7.8  /  Alb  3.9  /  TBili  0.4  /  DBili  x   /  AST  10  /  ALT  8   /  AlkPhos  92  06-16      A1C with Estimated Average Glucose Result: 10.5 % (06-13-25 @ 20:45)      Thyroid Function Tests:

## 2025-06-16 NOTE — DIETITIAN INITIAL EVALUATION ADULT - NS FNS DIET ORDER
Diet, Regular:   Consistent Carbohydrate {No Snacks} (CSTCHO)  Low Fat (LOWFAT) (06-15-25 @ 12:32) [Active]

## 2025-06-16 NOTE — DIETITIAN INITIAL EVALUATION ADULT - OTHER INFO
Pt seen at bedside. Pt s/p clear liquid diet. Pt reported good PO intake of breakfast today. Patient denies difficulty chewing or swallowing at present. Pt denies any nausea, vomiting, diarrhea, or constipation at this time. Per Pt, last BM yesterday 6/15. Ordered for bowel regimen. No edema or pressure injury noted, per RN flowsheet. No prior wt hx per Marya HIREKHA. Labs noted for hyperglycemia and elevated HgA1c 10.5% (6/13). Pt provided with written and verbal nutrition education on type 2 DM diet. Topics discussed include: Avoidance of sugar sweetened beverages and recommended alternatives, label reading, sources of carbohydrates, carbohydrate counting, complex vs simple carbohydrates, inclusion of whole grains and fiber, mixed meals (pairing protein with carbohydrate for optimal blood glucose response), hypoglycemia prevention/management and timing of meals/snacks. Discussed importance of self monitoring blood glucose and encouraged outpatient endocrinology follow up. Pt verbalized understanding of nutrition education.

## 2025-06-16 NOTE — DIETITIAN INITIAL EVALUATION ADULT - OTHER CALCULATIONS
Defer fluid needs to MD/Team.   Ideal Body Weight: 148lbs / 67.1kg +/-10%. IBW used to calculate nutritional estimated needs.

## 2025-06-16 NOTE — DIETITIAN INITIAL EVALUATION ADULT - REASON FOR ADMISSION
acute pancreatitis  Per chart, Pt is 65 -year-old male with NIDDM2, HTN, HLD, presenting from home with 1 week of intermittent epigastric pain found to have acute interstitial edematous pancreatitis

## 2025-06-16 NOTE — PROGRESS NOTE ADULT - PROBLEM SELECTOR PLAN 2
poorly controlled, w/ hyperglycemia and A1c 10.5   - endo following  - c/w basal/bolus, adjust prn  - new insulin, teaching and coverage check

## 2025-06-16 NOTE — DIETITIAN INITIAL EVALUATION ADULT - PERTINENT LABORATORY DATA
06-16    137  |  97[L]  |  9   ----------------------------<  230[H]  3.8   |  23  |  1.00    Ca    9.5      16 Jun 2025 06:30  Phos  3.1     06-16  Mg     1.90     06-16    TPro  7.8  /  Alb  3.9  /  TBili  0.4  /  DBili  x   /  AST  10  /  ALT  8   /  AlkPhos  92  06-16  CAPILLARY BLOOD GLUCOSE  POCT Blood Glucose.: 267 mg/dL (16 Jun 2025 12:15)  POCT Blood Glucose.: 220 mg/dL (16 Jun 2025 08:28)  POCT Blood Glucose.: 239 mg/dL (15 Alex 2025 22:44)  POCT Blood Glucose.: 224 mg/dL (15 Alex 2025 21:44)  POCT Blood Glucose.: 218 mg/dL (15 Alex 2025 17:29)    A1C with Estimated Average Glucose Result: 10.5 % (06-13-25 @ 20:45)

## 2025-06-16 NOTE — DIETITIAN INITIAL EVALUATION ADULT - ORAL INTAKE PTA/DIET HISTORY
Patient reports generally good appetite/PO intake PTA, consumes a low sugar diet at baseline. Pt confirms NKFA, food intolerance. Pt reported UBW ~210lbs/95.4kg. Pt denies any recent weight changes. Pt reported drinking wine PTA.

## 2025-06-16 NOTE — PROGRESS NOTE ADULT - PROBLEM SELECTOR PLAN 1
No prior episodes, TG wnl, no stones on gallbladder imaging  - c/w IVF   - c/w pain control  - tolerating regular diet  - pending MRCP pending for further eval given relatively isolated involvement of the pancreatic head

## 2025-06-17 ENCOUNTER — TRANSCRIPTION ENCOUNTER (OUTPATIENT)
Age: 65
End: 2025-06-17

## 2025-06-17 VITALS
HEART RATE: 68 BPM | TEMPERATURE: 98 F | DIASTOLIC BLOOD PRESSURE: 92 MMHG | OXYGEN SATURATION: 100 % | RESPIRATION RATE: 18 BRPM | SYSTOLIC BLOOD PRESSURE: 152 MMHG

## 2025-06-17 LAB
GLUCOSE BLDC GLUCOMTR-MCNC: 209 MG/DL — HIGH (ref 70–99)
GLUCOSE BLDC GLUCOMTR-MCNC: 245 MG/DL — HIGH (ref 70–99)
GLUCOSE BLDC GLUCOMTR-MCNC: 293 MG/DL — HIGH (ref 70–99)
IGG FLD-MCNC: 1456 MG/DL — SIGNIFICANT CHANGE UP (ref 610–1660)
IGG1 SER-MCNC: 829 MG/DL — SIGNIFICANT CHANGE UP (ref 240–1118)
IGG2 SER-MCNC: 486 MG/DL — SIGNIFICANT CHANGE UP (ref 124–549)
IGG3 SER-MCNC: 34.6 MG/DL — SIGNIFICANT CHANGE UP (ref 15–102)
IGG4 SER-MCNC: 62.8 MG/DL — SIGNIFICANT CHANGE UP (ref 1–123)

## 2025-06-17 PROCEDURE — 99239 HOSP IP/OBS DSCHRG MGMT >30: CPT

## 2025-06-17 PROCEDURE — 74183 MRI ABD W/O CNTR FLWD CNTR: CPT | Mod: 26

## 2025-06-17 PROCEDURE — 99232 SBSQ HOSP IP/OBS MODERATE 35: CPT

## 2025-06-17 RX ORDER — ISOPROPYL ALCOHOL, BENZOCAINE .7; .06 ML/ML; ML/ML
0 SWAB TOPICAL
Qty: 100 | Refills: 1
Start: 2025-06-17

## 2025-06-17 RX ORDER — INSULIN GLARGINE-YFGN 100 [IU]/ML
26 INJECTION, SOLUTION SUBCUTANEOUS AT BEDTIME
Refills: 0 | Status: DISCONTINUED | OUTPATIENT
Start: 2025-06-17 | End: 2025-06-17

## 2025-06-17 RX ORDER — INSULIN LISPRO 100 U/ML
11 INJECTION, SOLUTION INTRAVENOUS; SUBCUTANEOUS
Refills: 0 | Status: DISCONTINUED | OUTPATIENT
Start: 2025-06-17 | End: 2025-06-17

## 2025-06-17 RX ORDER — INSULIN GLARGINE-YFGN 100 [IU]/ML
26 INJECTION, SOLUTION SUBCUTANEOUS
Qty: 5 | Refills: 0
Start: 2025-06-17 | End: 2025-07-16

## 2025-06-17 RX ORDER — INSULIN GLARGINE-YFGN 100 [IU]/ML
26 INJECTION, SOLUTION SUBCUTANEOUS
Qty: 1 | Refills: 0
Start: 2025-06-17 | End: 2025-09-14

## 2025-06-17 RX ORDER — METFORMIN HYDROCHLORIDE 850 MG/1
1 TABLET ORAL
Qty: 60 | Refills: 0
Start: 2025-06-17 | End: 2025-07-16

## 2025-06-17 RX ADMIN — INSULIN LISPRO 8 UNIT(S): 100 INJECTION, SOLUTION INTRAVENOUS; SUBCUTANEOUS at 12:45

## 2025-06-17 RX ADMIN — ENOXAPARIN SODIUM 40 MILLIGRAM(S): 100 INJECTION SUBCUTANEOUS at 17:12

## 2025-06-17 RX ADMIN — INSULIN LISPRO 3: 100 INJECTION, SOLUTION INTRAVENOUS; SUBCUTANEOUS at 17:12

## 2025-06-17 RX ADMIN — INSULIN LISPRO 8 UNIT(S): 100 INJECTION, SOLUTION INTRAVENOUS; SUBCUTANEOUS at 08:36

## 2025-06-17 RX ADMIN — INSULIN LISPRO 2: 100 INJECTION, SOLUTION INTRAVENOUS; SUBCUTANEOUS at 08:36

## 2025-06-17 RX ADMIN — INSULIN LISPRO 11 UNIT(S): 100 INJECTION, SOLUTION INTRAVENOUS; SUBCUTANEOUS at 17:13

## 2025-06-17 RX ADMIN — INSULIN LISPRO 2: 100 INJECTION, SOLUTION INTRAVENOUS; SUBCUTANEOUS at 12:44
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
## 2025-06-17 NOTE — DISCHARGE NOTE PROVIDER - HOSPITAL COURSE
65M DM2, HTN, HLD, presenting from home with 1 week of intermittent epigastric pain found to have acute interstitial edematous pancreatitis    Acute pancreatitis. No prior episodes, TG wnl, no stones on gallbladder imaging  - tolerating regular diet  - Igg subset pending  - MRCP-->Findings are suggestive of acute pancreatitis involving the pancreatic uncinate process, head and neck. Differentials include focal autoimmune pancreatitis.  No abrupt cut off of the pancreatic duct. However, there is relative narrowing of the main pancreatic duct of the uncinate process, head, neck with normal caliber of the upstream duct.  - OP GI f/u.    Type 2 diabetes mellitus, without long-term current use of insulin.   ·  Plan: poorly controlled, w/ hyperglycemia and A1c 10.5   - endo eval  - new insulin, teaching and coverage check.    Hyperlipidemia.  on Lipitor 10 mg qhs, , not at goal   - c/w atorvastatin 20 mg.    no PT needs   dc home.   65M DM2, HTN, HLD, presenting from home with 1 week of intermittent epigastric pain found to have acute interstitial edematous pancreatitis    Acute pancreatitis. No prior episodes, TG wnl, no stones on gallbladder imaging  - tolerating regular diet  - Igg subset wnl  - MRCP-->Findings are suggestive of acute pancreatitis involving the pancreatic uncinate process, head and neck. Differentials include focal autoimmune pancreatitis.  No abrupt cut off of the pancreatic duct. However, there is relative narrowing of the main pancreatic duct of the uncinate process, head, neck with normal caliber of the upstream duct.  - OP GI f/u.    Type 2 diabetes mellitus, without long-term current use of insulin.   ·  Plan: poorly controlled, w/ hyperglycemia and A1c 10.5   - endo eval  - new insulin, teaching and coverage check.    Hyperlipidemia.  on Lipitor 10 mg qhs, , not at goal   - c/w atorvastatin 20 mg.    no PT needs   dc home.   65M DM2, HTN, HLD, presenting from home with 1 week of intermittent epigastric pain found to have acute interstitial edematous pancreatitis    Acute pancreatitis. No prior episodes, TG wnl, no stones on gallbladder imaging  - tolerating regular diet  - Igg subset wnl  - MRCP-->Findings are suggestive of acute pancreatitis involving the pancreatic uncinate process, head and neck. Differentials include focal autoimmune pancreatitis.  No abrupt cut off of the pancreatic duct. However, there is relative narrowing of the main pancreatic duct of the uncinate process, head, neck with normal caliber of the upstream duct.  - OP GI f/u.    Type 2 diabetes mellitus, without long-term current use of insulin, poorly controlled, w/ hyperglycemia and A1c 10.5   - endo eval  - new insulin, teaching and coverage checked, basal/metformin for dc     Hyperlipidemia.  on Lipitor 10 mg qhs, , not at goal   -  atorvastatin 20 mg.    no PT needs   dc home

## 2025-06-17 NOTE — DISCHARGE NOTE PROVIDER - CARE PROVIDER_API CALL
Tri Vidal  Internal Medicine  [address] [place] [address]  Phone: [phone]  Fax: [phone]  Follow Up Time:

## 2025-06-17 NOTE — PROGRESS NOTE ADULT - SUBJECTIVE AND OBJECTIVE BOX
Chief Complaint: DM type 2     Interval Events: FS above goal. Good appetite. No N/V or abdominal pain.     MEDICATIONS  (STANDING):  atorvastatin 20 milliGRAM(s) Oral at bedtime  dextrose 50% Injectable 25 Gram(s) IV Push once  dextrose 50% Injectable 12.5 Gram(s) IV Push once  dextrose 50% Injectable 25 Gram(s) IV Push once  enoxaparin Injectable 40 milliGRAM(s) SubCutaneous every 24 hours  insulin glargine Injectable (LANTUS) 26 Unit(s) SubCutaneous at bedtime  insulin lispro (ADMELOG) corrective regimen sliding scale   SubCutaneous three times a day before meals  insulin lispro (ADMELOG) corrective regimen sliding scale   SubCutaneous at bedtime  insulin lispro Injectable (ADMELOG) 11 Unit(s) SubCutaneous three times a day before meals  senna 2 Tablet(s) Oral at bedtime    MEDICATIONS  (PRN):  acetaminophen     Tablet .. 650 milliGRAM(s) Oral every 6 hours PRN Mild Pain (1 - 3)  dextrose Oral Gel 15 Gram(s) Oral once PRN Blood Glucose LESS THAN 70 milliGRAM(s)/deciliter  oxycodone    5 mG/acetaminophen 325 mG 1 Tablet(s) Oral every 6 hours PRN moderate and severe pain (4 - 10)      Allergies    No Known Allergies    Intolerances    Review of Systems:  Constitutional: No fever/chills   Eyes: No blurry vision  Neuro: No tremors  HEENT: No pain  Cardiovascular: No chest pain, no palpitations  Respiratory: No SOB, no cough  GI: No nausea, vomiting or abdominal pain  : No dysuria  Endocrine: no polyuria, polydipsia    VITALS: T(C): 36.4 (06-17-25 @ 10:14)  T(F): 97.6 (06-17-25 @ 10:14), Max: 98.3 (06-16-25 @ 22:10)  HR: 78 (06-17-25 @ 10:14) (56 - 78)  BP: 145/86 (06-17-25 @ 10:14) (133/67 - 180/85)  RR:  (18 - 19)  SpO2:  (98% - 100%)  Wt(kg): --    Physical Exam:   GENERAL: NAD, well-groomed, well-developed  EYES: No proptosis, no lid lag, anicteric  HEENT:  Atraumatic, Normocephalic, moist mucous membranes  RESPIRATORY: non labored breathing   GI: Soft, nontender, non distended  MUSCULOSKELETAL: Full range of motion, normal strength  NEURO: A&Ox3    CAPILLARY BLOOD GLUCOSE  POCT Blood Glucose.: 245 mg/dL (17 Jun 2025 12:39)  POCT Blood Glucose.: 209 mg/dL (17 Jun 2025 08:29)  POCT Blood Glucose.: 230 mg/dL (16 Jun 2025 21:08)  POCT Blood Glucose.: 207 mg/dL (16 Jun 2025 17:08)      06-16    137  |  97[L]  |  9   ----------------------------<  230[H]  3.8   |  23  |  1.00    eGFR: 84    Ca    9.5      06-16  Mg     1.90     06-16  Phos  3.1     06-16    TPro  7.8  /  Alb  3.9  /  TBili  0.4  /  DBili  x   /  AST  10  /  ALT  8   /  AlkPhos  92  06-16      A1C with Estimated Average Glucose Result: 10.5 % (06-13-25 @ 20:45)

## 2025-06-17 NOTE — DISCHARGE NOTE NURSING/CASE MANAGEMENT/SOCIAL WORK - NSDCFUADDAPPT_GEN_ALL_CORE_FT
APPTS ARE READY TO BE MADE: [x] YES    Best Family or Patient Contact (if needed):    Additional Information about above appointments (if needed):    1: primary care  2: GI  3: endo  4: Dr. Vidal     Other comments or requests:

## 2025-06-17 NOTE — DISCHARGE NOTE PROVIDER - NSDCMRMEDTOKEN_GEN_ALL_CORE_FT
acetaminophen 325 mg oral tablet: 2 tab(s) orally every 6 hours As needed Mild Pain (1 - 3)  atorvastatin 20 mg oral tablet: 1 tab(s) orally once a day (at bedtime)  Lantus Solostar Pen 100 units/mL subcutaneous solution: 22 unit(s) subcutaneous once a day (at bedtime) unit(s) subcutaneous once a day  metFORMIN 1000 mg oral tablet: 1 tab(s) orally 2 times a day  senna leaf extract oral tablet: 2 tab(s) orally once a day (at bedtime) as needed for  constipation   acetaminophen 325 mg oral tablet: 2 tab(s) orally every 6 hours As needed Mild Pain (1 - 3)  alcohol swabs : Apply topically to affected area 4 times a day  alcohol swabs: Apply topically to affected area 4 times a day  atorvastatin 20 mg oral tablet: 1 tab(s) orally once a day (at bedtime)  glucometer (per patient&#x27;s insurance): Test blood sugars four times a day. Dispense #1 glucometer.  glucometer (per patient&#x27;s insurance): Test blood sugars four times a day. Dispense #1 glucometer.  glucose tablets: Follow instructions on bottle when sugar is low.  glucose tablets: Follow instructions on bottle when sugar is low.  Insulin Pen Needles, 4mm: 1 application subcutaneously 4 times a day. ** Use with insulin pen **  Insulin Pen Needles, 4mm: 1 application subcutaneously daily. ** Use with insulin pen **  lancets: 1 application subcutaneously 4 times a day  lancets: 1 application subcutaneously 4 times a day  Lantus Solostar Pen 100 units/mL subcutaneous solution: 26 unit(s) subcutaneous once a day (at bedtime) 26 unit(s) subcutaneous once a day at bedtime  metFORMIN 1000 mg oral tablet: 1 tab(s) orally 2 times a day  senna leaf extract oral tablet: 2 tab(s) orally once a day (at bedtime) as needed for  constipation  test strips (per patient&#x27;s insurance): 1 application subcutaneously 4 times a day. ** Compatible with patient&#x27;s glucometer **  test strips (per patient&#x27;s insurance): 1 application subcutaneously 4 times a day. ** Compatible with patient&#x27;s glucometer **   acetaminophen 325 mg oral tablet: 2 tab(s) orally every 6 hours As needed Mild Pain (1 - 3)  alcohol swabs: Apply topically to affected area 4 times a day  atorvastatin 20 mg oral tablet: 1 tab(s) orally once a day (at bedtime)  glucometer (per patient&#x27;s insurance): Test blood sugars four times a day. Dispense #1 glucometer.  glucose tablets: Follow instructions on bottle when sugar is low.  Insulin Pen Needles, 4mm: 1 application subcutaneously daily. ** Use with insulin pen **  lancets: 1 application subcutaneously 4 times a day  Lantus Solostar Pen 100 units/mL subcutaneous solution: 26 unit(s) subcutaneous once a day (at bedtime) 26 unit(s) subcutaneous once a day at bedtime  metFORMIN 1000 mg oral tablet: 1 tab(s) orally 2 times a day  senna leaf extract oral tablet: 2 tab(s) orally once a day (at bedtime) as needed for  constipation  test strips (per patient&#x27;s insurance): 1 application subcutaneously 4 times a day. ** Compatible with patient&#x27;s glucometer **   acetaminophen 325 mg oral tablet: 2 tab(s) orally every 6 hours As needed Mild Pain (1 - 3)  alcohol swabs: Apply topically to affected area 4 times a day  atorvastatin 20 mg oral tablet: 1 tab(s) orally once a day (at bedtime)  glucometer (per patient&#x27;s insurance): Test blood sugars four times a day. Dispense #1 glucometer.  glucose tablets: Follow instructions on bottle when sugar is low.  Insulin Pen Needles, 4mm: 1 application subcutaneously daily. ** Use with insulin pen **  lancets: 1 application subcutaneously 4 times a day  metFORMIN 1000 mg oral tablet: 1 tab(s) orally 2 times a day  senna leaf extract oral tablet: 2 tab(s) orally once a day (at bedtime) as needed for  constipation  test strips (per patient&#x27;s insurance): 1 application subcutaneously 4 times a day. ** Compatible with patient&#x27;s glucometer **  Rod Humphries Prefilled Pen 300 units/mL subcutaneous solution: 26 unit(s) subcutaneous once a day (at bedtime)

## 2025-06-17 NOTE — PROGRESS NOTE ADULT - SUBJECTIVE AND OBJECTIVE BOX
Patient is a 65y old  Male who presents with a chief complaint of acute pancreatitis (17 Jun 2025 10:07)    SUBJECTIVE / OVERNIGHT EVENTS:    no CP, SOB, f/c/n/v    MEDICATIONS  (STANDING):  atorvastatin 20 milliGRAM(s) Oral at bedtime  enoxaparin Injectable 40 milliGRAM(s) SubCutaneous every 24 hours  insulin glargine Injectable (LANTUS) 22 Unit(s) SubCutaneous at bedtime  insulin lispro (ADMELOG) corrective regimen sliding scale   SubCutaneous three times a day before meals  insulin lispro (ADMELOG) corrective regimen sliding scale   SubCutaneous at bedtime  insulin lispro Injectable (ADMELOG) 8 Unit(s) SubCutaneous three times a day before meals  senna 2 Tablet(s) Oral at bedtime    MEDICATIONS  (PRN):  acetaminophen     Tablet .. 650 milliGRAM(s) Oral every 6 hours PRN Mild Pain (1 - 3)  dextrose Oral Gel 15 Gram(s) Oral once PRN Blood Glucose LESS THAN 70 milliGRAM(s)/deciliter  oxycodone    5 mG/acetaminophen 325 mG 1 Tablet(s) Oral every 6 hours PRN moderate and severe pain (4 - 10)    T(C): 36.4 (06-17-25 @ 10:14), Max: 36.8 (06-16-25 @ 18:10)  HR: 78 (06-17-25 @ 10:14) (56 - 78)  BP: 145/86 (06-17-25 @ 10:14) (133/67 - 180/85)  RR: 18 (06-17-25 @ 10:14) (18 - 19)  SpO2: 100% (06-17-25 @ 10:14) (98% - 100%)    CAPILLARY BLOOD GLUCOSE  POCT Blood Glucose.: 209 mg/dL (17 Jun 2025 08:29)  POCT Blood Glucose.: 230 mg/dL (16 Jun 2025 21:08)  POCT Blood Glucose.: 207 mg/dL (16 Jun 2025 17:08)  POCT Blood Glucose.: 267 mg/dL (16 Jun 2025 12:15)    I&O's Summary    16 Jun 2025 07:01  -  17 Jun 2025 07:00  --------------------------------------------------------  IN: 1030 mL / OUT: 0 mL / NET: 1030 mL    PHYSICAL EXAM:  GENERAL: NAD   CHEST/LUNG: Clear to auscultation bilaterally; No wheeze  HEART: Regular rate and rhythm; No murmurs, rubs, or gallops  ABDOMEN: Soft, Nontender, Nondistended; Bowel sounds present  EXTREMITIES:   warm and well perfused, No clubbing, cyanosis, or edema  PSYCH: AAOx3  NEUROLOGY: non-focal  SKIN: No rashes or lesions    LABS:                        17.0   4.79  )-----------( 196      ( 16 Jun 2025 06:30 )             52.3     06-16    137  |  97[L]  |  9   ----------------------------<  230[H]  3.8   |  23  |  1.00    Ca    9.5      16 Jun 2025 06:30  Phos  3.1     06-16  Mg     1.90     06-16    TPro  7.8  /  Alb  3.9  /  TBili  0.4  /  DBili  x   /  AST  10  /  ALT  8   /  AlkPhos  92  06-16    Care Discussed with Consultants/Other Providers:   Patient is a 65y old  Male who presents with a chief complaint of acute pancreatitis (17 Jun 2025 10:07)    SUBJECTIVE / OVERNIGHT EVENTS:    no CP, SOB, f/c/n/v  tolerating all his meals  mild intermittent pain  last BM 6/15    MEDICATIONS  (STANDING):  atorvastatin 20 milliGRAM(s) Oral at bedtime  enoxaparin Injectable 40 milliGRAM(s) SubCutaneous every 24 hours  insulin glargine Injectable (LANTUS) 22 Unit(s) SubCutaneous at bedtime  insulin lispro (ADMELOG) corrective regimen sliding scale   SubCutaneous three times a day before meals  insulin lispro (ADMELOG) corrective regimen sliding scale   SubCutaneous at bedtime  insulin lispro Injectable (ADMELOG) 8 Unit(s) SubCutaneous three times a day before meals  senna 2 Tablet(s) Oral at bedtime    MEDICATIONS  (PRN):  acetaminophen     Tablet .. 650 milliGRAM(s) Oral every 6 hours PRN Mild Pain (1 - 3)  dextrose Oral Gel 15 Gram(s) Oral once PRN Blood Glucose LESS THAN 70 milliGRAM(s)/deciliter  oxycodone    5 mG/acetaminophen 325 mG 1 Tablet(s) Oral every 6 hours PRN moderate and severe pain (4 - 10)    T(C): 36.4 (06-17-25 @ 10:14), Max: 36.8 (06-16-25 @ 18:10)  HR: 78 (06-17-25 @ 10:14) (56 - 78)  BP: 145/86 (06-17-25 @ 10:14) (133/67 - 180/85)  RR: 18 (06-17-25 @ 10:14) (18 - 19)  SpO2: 100% (06-17-25 @ 10:14) (98% - 100%)    CAPILLARY BLOOD GLUCOSE  POCT Blood Glucose.: 209 mg/dL (17 Jun 2025 08:29)  POCT Blood Glucose.: 230 mg/dL (16 Jun 2025 21:08)  POCT Blood Glucose.: 207 mg/dL (16 Jun 2025 17:08)  POCT Blood Glucose.: 267 mg/dL (16 Jun 2025 12:15)    I&O's Summary    16 Jun 2025 07:01  -  17 Jun 2025 07:00  --------------------------------------------------------  IN: 1030 mL / OUT: 0 mL / NET: 1030 mL    PHYSICAL EXAM:  GENERAL: NAD   CHEST/LUNG: Clear to auscultation bilaterally; No wheeze  HEART: Regular rate and rhythm; No murmurs, rubs, or gallops  ABDOMEN: Soft, Nontender, Nondistended; Bowel sounds present  EXTREMITIES:   warm and well perfused, No clubbing, cyanosis, or edema  PSYCH: AAOx3  NEUROLOGY: non-focal  SKIN: No rashes or lesions    LABS:                        17.0   4.79  )-----------( 196      ( 16 Jun 2025 06:30 )             52.3     06-16    137  |  97[L]  |  9   ----------------------------<  230[H]  3.8   |  23  |  1.00    Ca    9.5      16 Jun 2025 06:30  Phos  3.1     06-16  Mg     1.90     06-16    TPro  7.8  /  Alb  3.9  /  TBili  0.4  /  DBili  x   /  AST  10  /  ALT  8   /  AlkPhos  92  06-16    Care Discussed with Consultants/Other Providers:

## 2025-06-17 NOTE — PROGRESS NOTE ADULT - ASSESSMENT
65 year-old male with NIDDM2, HTN, HLD, presenting from home with 1 week of intermittent epigastric pain, radiating to the back, described as a "biting pain" that is worse with eating. Denies any associated nausea or vomiting. Patient reports having similar pains in the past but denies any known history of pancreatitis. Endocrinology was consulted for management of diabetes mellitus.    Type 2 Diabetes Mellitus, uncontrolled  - HbA1c 10.5%  - home regimen: glyburide/metformin 2.5-500 mg bid. NOT taking Jardiance (in med rec), does not want to take Jardiance, concerned about side effects    Inpatient plan   - FS goal 100-180 mg/dl   - FS above goal   - increase Lantus to 22 units at bedtime   - agree with increased dose of Admelog 8 units TID AC. Hold if not eating/NPO.   - continue low Admelog correctional scale TID AC  ---> q6 if NPO  - continue separate low Admelog correctional scale at HS  - FS TID AC & HS ---> q6 if NPO   - hypoglycemia protocol PRN   - consistent carbohydrate diet  - RD consult  - provider to RN - insulin pen + glucometer use teaching       Discharge plan  - STOP Glyburide/metformin  - patient agreed to basal insulin + oral regimen   - Please send prescription for Lantus pen at current dose, will adjust dose on discharge if needed.   - discharge on: Lantus pen --- units at bedtime + metformin 1000 mg PO BID   - please send prescription for glucometer and supplies - test strip, lancets, alcohol pads and insulin pen needles.   - Please send prescription for glucose tablets 4G (take 4 tablets) or 15G tablets for blood sugar less than 70 mG/dL, repeat fingerstick in 15 minutes. Call your provider for dose adjustment if needed.   - avoid DPP4 and GLP1 given concern for pancreatitis   - follow up with Dr Minor Hidalgo  [address], [place] [address]   (office made aware to call the patient for appointment on 06/16)        Hypertension  - outpatient BP goal <130/80   - management per primary team     Hyperlipidemia  - LDL goal <70   -    - continue statin   - management per primary team           NIELS SanchezP-BC  Nurse Practitioner  Division of Endocrinology & Diabetes  Available via [employer] Teams 
65 year-old male with NIDDM2, HTN, HLD, presenting from home with 1 week of intermittent epigastric pain, radiating to the back, described as a "biting pain" that is worse with eating. Denies any associated nausea or vomiting. Patient reports having similar pains in the past but denies any known history of pancreatitis. Endocrinology was consulted for management of diabetes mellitus.    Type 2 Diabetes Mellitus, uncontrolled  - HbA1c 10.5%  - home regimen: glyburide/metformin 2.5-500 mg bid. NOT taking Jardiance (in med rec), does not want to take Jardiance, concerned about side effects    Inpatient plan   - FS goal 100-180 mg/dl   - FS above goal   - increase Lantus to 26 units at bedtime   - increased Admelog to 11 units TID AC. Hold if not eating/NPO.   - continue low Admelog correctional scale TID AC  ---> q6 if NPO  - continue separate low Admelog correctional scale at HS  - FS TID AC & HS ---> q6 if NPO   - hypoglycemia protocol PRN   - consistent carbohydrate diet  - RD consult  - provider to RN - insulin pen + glucometer use teaching       Discharge plan  - STOP Glyburide/metformin  - patient agreed to basal insulin + oral regimen   - Please send prescription for Lantus pen at current dose, will adjust dose on discharge if needed.   - discharge on: Lantus pen --- units at bedtime + metformin 1000 mg PO BID   - please send prescription for glucometer and supplies - test strip, lancets, alcohol pads and insulin pen needles.   - Please send prescription for glucose tablets 4G (take 4 tablets) or 15G tablets for blood sugar less than 70 mG/dL, repeat fingerstick in 15 minutes. Call your provider for dose adjustment if needed.   - avoid DPP4 and GLP1 given concern for pancreatitis   - follow up with Dr Minor Hidalgo  [address], [place] [address]   (office made aware to call the patient for appointment on 06/16)        Hypertension  - outpatient BP goal <130/80   - management per primary team       Hyperlipidemia  - LDL goal <70   -    - continue statin   - management per primary team           Angela Irwin, NIELSP-BC  Nurse Practitioner  Division of Endocrinology & Diabetes  Available via Microsoft Teams 
65 -year-old male with NIDDM2, HTN, HLD, presenting from home with 1 week of intermittent epigastric pain found to have acute interstitial edematous pancreatitis
65M DM2, HTN, HLD, presenting from home with 1 week of intermittent epigastric pain found to have acute interstitial edematous pancreatitis
65 -year-old male with NIDDM2, HTN, HLD, presenting from home with 1 week of intermittent epigastric pain found to have acute interstitial edematous pancreatitis
65M DM2, HTN, HLD, presenting from home with 1 week of intermittent epigastric pain found to have acute interstitial edematous pancreatitis

## 2025-06-17 NOTE — DISCHARGE NOTE PROVIDER - NSDCFUSCHEDAPPT_GEN_ALL_CORE_FT
Tri Vidal  [facility]  INTMED [address]  Scheduled Appointment: 06/23/2025    Elisha Apple  [facility]  GASTRO [address]  Scheduled Appointment: 06/30/2025

## 2025-06-17 NOTE — DISCHARGE NOTE PROVIDER - NSDCCPTREATMENT_GEN_ALL_CORE_FT
PRINCIPAL PROCEDURE  Procedure: MR MRCP  Findings and Treatment: LIVER: Within normal limits.  BILE DUCTS: Normal caliber.  GALLBLADDER: Within normal limits.  SPLEEN: Within normal limits.  PANCREAS: Prominent pancreatic uncinate process, head, and neck   demonstrating mild T2 hyperintensity with smooth contour and loss of   normal feathery contour. The pancreatic uncinate process, head, neck   demonstrates T1 hypointense signal with hypoenhancement in the arterial   phase and diffusion restriction. The pancreatic duct of the pancreatic   uncinate process, head and neck demonstrates narrowing without cut off or   obliteration. The upstream main pancreatic duct in the body and tail   demonstrates normal caliber.  ADRENALS: Within normal limits.  KIDNEYS/URETERS: Left renal upper pole simple cyst. No hydronephrosis.  VISUALIZED PORTIONS:  BOWEL: Within normal limits.  PERITONEUM: No ascites.  VESSELS: Within normal limits.  RETROPERITONEUM/LYMPH NODES: No lymphadenopathy.  ABDOMINAL WALL: Within normal limits.  BONES: Within normal limits.  IMPRESSION:  Findings are suggestive of acute pancreatitis involving the pancreatic   uncinate process, head and neck. Differentials include focal autoimmune   pancreatitis.  No abrupt cut off of the pancreatic duct. However, there is relative   narrowing of the main pancreatic duct of the uncinate process, head, neck   with normal caliber of the upstream duct.

## 2025-06-17 NOTE — PROGRESS NOTE ADULT - PROBLEM SELECTOR PROBLEM 2
Essential hypertension
Essential hypertension
Type 2 diabetes mellitus, without long-term current use of insulin

## 2025-06-17 NOTE — PROGRESS NOTE ADULT - PROBLEM SELECTOR PROBLEM 1
Type 2 diabetes mellitus, without long-term current use of insulin
Acute pancreatitis
Type 2 diabetes mellitus, without long-term current use of insulin
Acute pancreatitis

## 2025-06-17 NOTE — PROGRESS NOTE ADULT - PROBLEM SELECTOR PLAN 1
No prior episodes, TG wnl, no stones on gallbladder imaging  - c/w IVF   - c/w pain control  - tolerating regular diet  - pending MRCP pending for further eval given relatively isolated involvement of the pancreatic head No prior episodes, TG wnl, no stones on gallbladder imaging  - c/w IVF   - c/w pain control  - tolerating regular diet  - Igg subset pending  - MRCP-->Findings are suggestive of acute pancreatitis involving the pancreatic   uncinate process, head and neck. Differentials include focal autoimmune pancreatitis.  No abrupt cut off of the pancreatic duct. However, there is relative narrowing of the main pancreatic duct of the uncinate process, head, neck with normal caliber of the upstream duct.  - OP GI f/u

## 2025-06-17 NOTE — DISCHARGE NOTE NURSING/CASE MANAGEMENT/SOCIAL WORK - PATIENT PORTAL LINK FT
You can access the FollowMyHealth Patient Portal offered by [facility] by registering at the following website: [email or web address]. By joining [facility]’s FollowMyHealth portal, you will also be able to view your health information using other applications (apps) compatible with our system.

## 2025-06-17 NOTE — DISCHARGE NOTE PROVIDER - NSDCCPCAREPLAN_GEN_ALL_CORE_FT
PRINCIPAL DISCHARGE DIAGNOSIS  Diagnosis: Pancreatitis  Assessment and Plan of Treatment: You had abdominal pain due to pancreatitis.  Please eat low fat diet.   For recurrent pain nausea/vomiting and inability to tolerate food intake return to ED.  Please eat low fat diet and follow-up with GI.   Avoid drinking any alcohol or taking new medications unless prescribed by a doctor.      SECONDARY DISCHARGE DIAGNOSES  Diagnosis: Type 2 diabetes mellitus, without long-term current use of insulin  Assessment and Plan of Treatment: Seen by endocrine and started on insulin.  Please take medications as prescribed and follow-up with primary care or endocrinology.

## 2025-06-17 NOTE — DISCHARGE NOTE PROVIDER - NSFOLLOWUPCLINICS_GEN_ALL_ED_FT
[facility]  Gastroenterology  [address] [place] [address]  Phone: [phone]  Fax:     [facility]  Endocrinology  [address] [place] [address]  Phone: [phone]  Fax:     [facility]  Gastroenterology  [address], [address]  [place] [address]  Phone: [phone]  Fax:

## 2025-06-17 NOTE — DISCHARGE NOTE PROVIDER - NSDCFUADDAPPT_GEN_ALL_CORE_FT
APPTS ARE READY TO BE MADE: [x] YES    Best Family or Patient Contact (if needed):    Additional Information about above appointments (if needed):    1: primary care  2: GI  3: endo  4: Dr. Vidal     Other comments or requests:    APPTS ARE READY TO BE MADE: [x] YES    Best Family or Patient Contact (if needed):    Additional Information about above appointments (if needed):    1: primary care  2: GI  3: endo  4: Dr. Vidal     Other comments or requests:   Appointment was scheduled in [facility]   06/30 3:15p with Dr. Apple; Gastro at [address] [place] [address]  06/23 10:40a with Dr. Vidal; Internal medicine [facility]     A [facility] office was contacted to secure an appointment, however the office will follow up with the patient/caregiver directly.

## 2025-06-17 NOTE — DISCHARGE NOTE NURSING/CASE MANAGEMENT/SOCIAL WORK - FINANCIAL ASSISTANCE
[facility] provides services at a reduced cost to those who are determined to be eligible through [facility]’s financial assistance program. Information regarding [facility]’s financial assistance program can be found by going to [email or web address] or by calling [phone].

## 2025-06-19 PROBLEM — E78.5 HYPERLIPIDEMIA, UNSPECIFIED: Chronic | Status: ACTIVE | Noted: 2025-06-14

## 2025-06-23 ENCOUNTER — TRANSCRIPTION ENCOUNTER (OUTPATIENT)
Age: 65
End: 2025-06-23

## 2025-06-23 ENCOUNTER — APPOINTMENT (OUTPATIENT)
Dept: INTERNAL MEDICINE | Facility: CLINIC | Age: 65
End: 2025-06-23

## 2025-06-30 ENCOUNTER — APPOINTMENT (OUTPATIENT)
Dept: GASTROENTEROLOGY | Facility: CLINIC | Age: 65
End: 2025-06-30
Payer: COMMERCIAL

## 2025-06-30 VITALS
SYSTOLIC BLOOD PRESSURE: 144 MMHG | DIASTOLIC BLOOD PRESSURE: 84 MMHG | WEIGHT: 208 LBS | HEIGHT: 66 IN | BODY MASS INDEX: 33.43 KG/M2 | HEART RATE: 76 BPM | OXYGEN SATURATION: 97 %

## 2025-06-30 PROCEDURE — 99205 OFFICE O/P NEW HI 60 MIN: CPT

## 2025-07-21 ENCOUNTER — APPOINTMENT (OUTPATIENT)
Facility: CLINIC | Age: 65
End: 2025-07-21
Payer: COMMERCIAL

## 2025-07-21 VITALS
BODY MASS INDEX: 33.75 KG/M2 | SYSTOLIC BLOOD PRESSURE: 123 MMHG | WEIGHT: 210 LBS | HEART RATE: 81 BPM | DIASTOLIC BLOOD PRESSURE: 63 MMHG | HEIGHT: 66 IN

## 2025-07-21 DIAGNOSIS — K85.90 ACUTE PANCREATITIS WITHOUT NECROSIS OR INFECTION, UNSPECIFIED: ICD-10-CM

## 2025-07-21 DIAGNOSIS — E78.2 MIXED HYPERLIPIDEMIA: ICD-10-CM

## 2025-07-21 LAB
ALBUMIN, RANDOM URINE: 4 MG/DL
CREAT SPEC-SCNC: 174 MG/DL
HBA1C MFR BLD HPLC: 10.1
MICROALBUMIN/CREAT 24H UR-RTO: 23 MG/G

## 2025-07-21 PROCEDURE — 99215 OFFICE O/P EST HI 40 MIN: CPT

## 2025-07-21 PROCEDURE — 83036 HEMOGLOBIN GLYCOSYLATED A1C: CPT | Mod: QW

## 2025-07-21 PROCEDURE — G2211 COMPLEX E/M VISIT ADD ON: CPT | Mod: NC

## 2025-07-21 RX ORDER — METFORMIN HYDROCHLORIDE 1000 MG/1
1000 TABLET, COATED ORAL TWICE DAILY
Qty: 180 | Refills: 1 | Status: ACTIVE | COMMUNITY
Start: 2025-07-21 | End: 1900-01-01

## 2025-07-21 RX ORDER — BLOOD-GLUCOSE,RECEIVER,CONT
EACH MISCELLANEOUS
Qty: 1 | Refills: 0 | Status: ACTIVE | COMMUNITY
Start: 2025-07-21 | End: 1900-01-01

## 2025-07-21 RX ORDER — CHOLECALCIFEROL (VITAMIN D3) 10(400)/ML
32G X 4 MM DROPS ORAL
Qty: 1 | Refills: 1 | Status: ACTIVE | COMMUNITY
Start: 2025-07-21 | End: 1900-01-01

## 2025-07-21 RX ORDER — BLOOD-GLUCOSE SENSOR
EACH MISCELLANEOUS
Qty: 6 | Refills: 3 | Status: ACTIVE | COMMUNITY
Start: 2025-07-21 | End: 1900-01-01

## 2025-07-21 RX ORDER — LANCING DEVICE
EACH MISCELLANEOUS 3 TIMES DAILY
Qty: 3 | Refills: 1 | Status: ACTIVE | COMMUNITY
Start: 2025-07-21 | End: 1900-01-01

## 2025-08-15 DIAGNOSIS — E11.9 TYPE 2 DIABETES MELLITUS W/OUT COMPLICATIONS: ICD-10-CM

## 2025-08-15 RX ORDER — PIOGLITAZONE HYDROCHLORIDE 15 MG/1
15 TABLET ORAL DAILY
Qty: 90 | Refills: 1 | Status: DISCONTINUED | COMMUNITY
Start: 2025-07-21 | End: 2025-08-15

## 2025-08-15 RX ORDER — INSULIN GLARGINE 100 [IU]/ML
100 INJECTION, SOLUTION SUBCUTANEOUS
Qty: 2 | Refills: 3 | Status: ACTIVE | COMMUNITY
Start: 2025-07-21 | End: 1900-01-01

## 2025-08-15 RX ORDER — REPAGLINIDE 1 MG/1
1 TABLET ORAL 3 TIMES DAILY
Qty: 240 | Refills: 1 | Status: ACTIVE | COMMUNITY
Start: 2025-08-15 | End: 1900-01-01

## 2025-09-18 ENCOUNTER — TRANSCRIPTION ENCOUNTER (OUTPATIENT)
Age: 65
End: 2025-09-18

## 2025-09-19 ENCOUNTER — TRANSCRIPTION ENCOUNTER (OUTPATIENT)
Age: 65
End: 2025-09-19